# Patient Record
Sex: FEMALE | Race: WHITE
[De-identification: names, ages, dates, MRNs, and addresses within clinical notes are randomized per-mention and may not be internally consistent; named-entity substitution may affect disease eponyms.]

---

## 2022-11-29 ENCOUNTER — HOSPITAL ENCOUNTER (INPATIENT)
Dept: HOSPITAL 54 - ER | Age: 52
LOS: 21 days | Discharge: NURSING, REHAB OR LONG-TERM CARE | DRG: 317 | End: 2022-12-20
Attending: INTERNAL MEDICINE | Admitting: INTERNAL MEDICINE
Payer: MEDICAID

## 2022-11-29 VITALS — HEIGHT: 62 IN | BODY MASS INDEX: 18.58 KG/M2 | WEIGHT: 101 LBS

## 2022-11-29 VITALS — DIASTOLIC BLOOD PRESSURE: 71 MMHG | SYSTOLIC BLOOD PRESSURE: 100 MMHG
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
## 2022-11-29 DIAGNOSIS — E87.5: ICD-10-CM

## 2022-11-29 DIAGNOSIS — E87.1: ICD-10-CM

## 2022-11-29 DIAGNOSIS — Z20.822: ICD-10-CM

## 2022-11-29 DIAGNOSIS — E43: ICD-10-CM

## 2022-11-29 DIAGNOSIS — D63.8: ICD-10-CM
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
## 2022-11-29 DIAGNOSIS — D50.9: ICD-10-CM

## 2022-11-29 DIAGNOSIS — Z59.00: ICD-10-CM

## 2022-11-29 DIAGNOSIS — B96.20: ICD-10-CM

## 2022-11-29 DIAGNOSIS — S70.311A: ICD-10-CM

## 2022-11-29 DIAGNOSIS — R23.4: ICD-10-CM

## 2022-11-29 DIAGNOSIS — S70.312A: ICD-10-CM

## 2022-11-29 DIAGNOSIS — D75.839: ICD-10-CM

## 2022-11-29 DIAGNOSIS — M72.6: Primary | ICD-10-CM

## 2022-11-29 DIAGNOSIS — T79.7XXA: ICD-10-CM

## 2022-11-29 DIAGNOSIS — D62: ICD-10-CM

## 2022-11-29 DIAGNOSIS — E88.09: ICD-10-CM

## 2022-11-29 DIAGNOSIS — F25.0: ICD-10-CM

## 2022-11-29 DIAGNOSIS — F17.210: ICD-10-CM

## 2022-11-29 DIAGNOSIS — B96.4: ICD-10-CM

## 2022-11-29 DIAGNOSIS — S30.810A: ICD-10-CM

## 2022-11-29 DIAGNOSIS — E87.6: ICD-10-CM

## 2022-11-29 DIAGNOSIS — L03.115: ICD-10-CM

## 2022-11-29 DIAGNOSIS — N17.0: ICD-10-CM

## 2022-11-29 DIAGNOSIS — E83.39: ICD-10-CM

## 2022-11-29 LAB
ALBUMIN SERPL BCP-MCNC: 1.7 G/DL (ref 3.4–5)
ALP SERPL-CCNC: 86 U/L (ref 46–116)
ALT SERPL W P-5'-P-CCNC: 25 U/L (ref 12–78)
AST SERPL W P-5'-P-CCNC: 20 U/L (ref 15–37)
BASOPHILS # BLD AUTO: 0 K/UL (ref 0–0.2)
BASOPHILS NFR BLD AUTO: 0.1 % (ref 0–2)
BILIRUB DIRECT SERPL-MCNC: 0.2 MG/DL (ref 0–0.2)
BILIRUB SERPL-MCNC: 0.5 MG/DL (ref 0.2–1)
BILIRUB UR QL STRIP: NEGATIVE
BUN SERPL-MCNC: 54 MG/DL (ref 7–18)
CALCIUM SERPL-MCNC: 8.3 MG/DL (ref 8.5–10.1)
CHLORIDE SERPL-SCNC: 93 MMOL/L (ref 98–107)
CO2 SERPL-SCNC: 23 MMOL/L (ref 21–32)
COLOR UR: YELLOW
CREAT SERPL-MCNC: 1.6 MG/DL (ref 0.6–1.3)
EOSINOPHIL NFR BLD AUTO: 0 % (ref 0–6)
GLUCOSE SERPL-MCNC: 102 MG/DL (ref 74–106)
GLUCOSE UR STRIP-MCNC: NEGATIVE MG/DL
HCT VFR BLD AUTO: 32 % (ref 33–45)
HGB BLD-MCNC: 10.2 G/DL (ref 11.5–14.8)
LEUKOCYTE ESTERASE UR QL STRIP: NEGATIVE
LYMPHOCYTES NFR BLD AUTO: 1.2 K/UL (ref 0.8–4.8)
LYMPHOCYTES NFR BLD AUTO: 3.5 % (ref 20–44)
LYMPHOCYTES NFR BLD MANUAL: 2 % (ref 16–48)
MCHC RBC AUTO-ENTMCNC: 33 G/DL (ref 31–36)
MCV RBC AUTO: 88 FL (ref 82–100)
MONOCYTES NFR BLD AUTO: 0.8 K/UL (ref 0.1–1.3)
MONOCYTES NFR BLD AUTO: 2.4 % (ref 2–12)
MONOCYTES NFR BLD MANUAL: 2 % (ref 0–11)
NEUTROPHILS # BLD AUTO: 31.4 K/UL (ref 1.8–8.9)
NEUTROPHILS NFR BLD AUTO: 94 % (ref 43–81)
NEUTS SEG NFR BLD MANUAL: 96 % (ref 42–76)
NITRITE UR QL STRIP: NEGATIVE
PH UR STRIP: 5.5 [PH] (ref 5–8)
PLATELET # BLD AUTO: 463 K/UL (ref 150–450)
POTASSIUM SERPL-SCNC: 5.4 MMOL/L (ref 3.5–5.1)
PROT SERPL-MCNC: 7.5 G/DL (ref 6.4–8.2)
PROT UR QL STRIP: (no result) MG/DL
RBC # BLD AUTO: 3.56 MIL/UL (ref 4–5.2)
RBC #/AREA URNS HPF: (no result) /HPF (ref 0–2)
SODIUM SERPL-SCNC: 124 MMOL/L (ref 136–145)
UROBILINOGEN UR STRIP-MCNC: 0.2 EU/DL
WBC #/AREA URNS HPF: (no result) /HPF (ref 0–3)
WBC NRBC COR # BLD AUTO: 33.4 K/UL (ref 4.3–11)

## 2022-11-29 PROCEDURE — C9803 HOPD COVID-19 SPEC COLLECT: HCPCS

## 2022-11-29 PROCEDURE — G0480 DRUG TEST DEF 1-7 CLASSES: HCPCS

## 2022-11-29 PROCEDURE — A6403 STERILE GAUZE>16 <= 48 SQ IN: HCPCS

## 2022-11-29 PROCEDURE — P9016 RBC LEUKOCYTES REDUCED: HCPCS

## 2022-11-29 PROCEDURE — A6209 FOAM DRSG <=16 SQ IN W/O BDR: HCPCS

## 2022-11-29 PROCEDURE — A4217 STERILE WATER/SALINE, 500 ML: HCPCS

## 2022-11-29 PROCEDURE — G0378 HOSPITAL OBSERVATION PER HR: HCPCS

## 2022-11-29 PROCEDURE — A6253 ABSORPT DRG > 48 SQ IN W/O B: HCPCS

## 2022-11-29 RX ADMIN — PIPERACILLIN SODIUM AND TAZOBACTAM SODIUM SCH MLS/HR: .25; 2 INJECTION, POWDER, LYOPHILIZED, FOR SOLUTION INTRAVENOUS at 23:14

## 2022-11-29 RX ADMIN — SODIUM CHLORIDE PRN MLS/HR: 9 INJECTION, SOLUTION INTRAVENOUS at 23:12

## 2022-11-29 SDOH — ECONOMIC STABILITY - HOUSING INSECURITY: HOMELESSNESS UNSPECIFIED: Z59.00

## 2022-11-29 NOTE — NUR
bibra 100 from [facility], sitting on bus bench w/ noted R foot infected wound 
per ems was agitated was given versed 5mg im pta. to er bed 6.

## 2022-11-29 NOTE — NUR
ADMISSION NOTES,

AT 2125 RECEIVED 56 YO FEMALE,  TRANSFERRED FROM ER VIA STRETCHER ACCOMPANIED BY 2 NURSES, 
UNDER MEDICAL SERVICED OF DR ARNDT, WITH ADMITTING DX CELLULITIS/WOUND, MALNOURISHED AND 
FRAGILE FEMALE FROM STREETS, FOUND ON BUS STOP, BROUGHT BY PARAMEDICS , UNABLE TO 
FOLLOW COMMANDS, PATIENT AWAKE, ALERT TO SELF, ON ROOM AIR, NO SOB/ACUTE DISTRESS NOTED, 
WITH GENERAL WEAKNESS, NOTED WITH RIGHT LOWER LEFT EXTENSIVE WOUND, DRAINING SANGUINEOUS 
DRAINAGE, LEFT LEF NOTED WITH CELLULITIS TOO NOT OPEN AT THIS TIME, BUT SWOLLEN AND REDNESS 
NOTED, WHEN ASKED IF PATIENT IS IN PAIN, SHE DENIES, OFFERED PAIN MEDICATION, PATIENT 
REFUSED, UNABLE TO PROVIDE INFORMATION, IV SITE IN RIGHT HAND 20G PATENT AND INTACT, SACRAL 
BILATERAL BUTTOCKS PRESSURE INJURIES, BACK REDNESS ESPECIALLY BONY PROMINENCES, CHEST 
PRESSURE SORE VS EXCORIATION, CHIN PRESSURE SORE, OPEN WOUND IN RIGHT HAND, WOUND CONSULT TO 
FOLLOW UP WITH PROPER TREATMENTS, DIETARY CONSULT AS WELL, WILL ADMINISTERED MEDICATIONS AS 
ORDERED, BED BATH PROVIDED UPON ADMISSION, BED LOCKED AND IN LOWEST POSITION, KEPT PATIENT 
CLEAN WARM AND COMFORTABLE, WOUND CLEANED AND DRESSED,  WILL CONTINUE TO MONITOR CLOSELY, VS 
STABLE, AFEBRILE.

## 2022-11-29 NOTE — NUR
[id number] from [facility], sitting on bus bench w/ noted R foot infected wound 
[id number] from [facility], sitting on bus bench w/ noted R foot infected wound

## 2022-11-30 VITALS — DIASTOLIC BLOOD PRESSURE: 54 MMHG | SYSTOLIC BLOOD PRESSURE: 90 MMHG

## 2022-11-30 VITALS — SYSTOLIC BLOOD PRESSURE: 99 MMHG | DIASTOLIC BLOOD PRESSURE: 53 MMHG

## 2022-11-30 VITALS — SYSTOLIC BLOOD PRESSURE: 115 MMHG | DIASTOLIC BLOOD PRESSURE: 73 MMHG

## 2022-11-30 VITALS — SYSTOLIC BLOOD PRESSURE: 93 MMHG | DIASTOLIC BLOOD PRESSURE: 59 MMHG

## 2022-11-30 VITALS — SYSTOLIC BLOOD PRESSURE: 93 MMHG | DIASTOLIC BLOOD PRESSURE: 55 MMHG

## 2022-11-30 LAB
BASOPHILS # BLD AUTO: 0.1 K/UL (ref 0–0.2)
BASOPHILS NFR BLD AUTO: 0.2 % (ref 0–2)
BUN SERPL-MCNC: 41 MG/DL (ref 7–18)
CALCIUM SERPL-MCNC: 7 MG/DL (ref 8.5–10.1)
CHLORIDE SERPL-SCNC: 99 MMOL/L (ref 98–107)
CO2 SERPL-SCNC: 25 MMOL/L (ref 21–32)
CREAT SERPL-MCNC: 1.1 MG/DL (ref 0.6–1.3)
EOSINOPHIL NFR BLD AUTO: 0 % (ref 0–6)
GLUCOSE SERPL-MCNC: 66 MG/DL (ref 74–106)
HCT VFR BLD AUTO: 26 % (ref 33–45)
HGB BLD-MCNC: 8.6 G/DL (ref 11.5–14.8)
LYMPHOCYTES NFR BLD AUTO: 0.8 K/UL (ref 0.8–4.8)
LYMPHOCYTES NFR BLD AUTO: 2.3 % (ref 20–44)
LYMPHOCYTES NFR BLD MANUAL: 4 % (ref 16–48)
MAGNESIUM SERPL-MCNC: 2.2 MG/DL (ref 1.8–2.4)
MCHC RBC AUTO-ENTMCNC: 34 G/DL (ref 31–36)
MCV RBC AUTO: 87 FL (ref 82–100)
MONOCYTES NFR BLD AUTO: 0.9 K/UL (ref 0.1–1.3)
MONOCYTES NFR BLD AUTO: 2.6 % (ref 2–12)
MONOCYTES NFR BLD MANUAL: 1 % (ref 0–11)
NEUTROPHILS # BLD AUTO: 32.9 K/UL (ref 1.8–8.9)
NEUTROPHILS NFR BLD AUTO: 94.9 % (ref 43–81)
NEUTS BAND NFR BLD MANUAL: 15 % (ref 0–5)
NEUTS SEG NFR BLD MANUAL: 80 % (ref 42–76)
PHOSPHATE SERPL-MCNC: 4.3 MG/DL (ref 2.5–4.9)
PLATELET # BLD AUTO: 355 K/UL (ref 150–450)
POTASSIUM SERPL-SCNC: 4.5 MMOL/L (ref 3.5–5.1)
RBC # BLD AUTO: 2.93 MIL/UL (ref 4–5.2)
SODIUM SERPL-SCNC: 130 MMOL/L (ref 136–145)
TSH SERPL DL<=0.005 MIU/L-ACNC: 2.78 UIU/ML (ref 0.36–3.74)
WBC NRBC COR # BLD AUTO: 34.7 K/UL (ref 4.3–11)

## 2022-11-30 RX ADMIN — Medication SCH ML: at 16:26

## 2022-11-30 RX ADMIN — Medication SCH ML: at 11:59

## 2022-11-30 RX ADMIN — PIPERACILLIN SODIUM AND TAZOBACTAM SODIUM SCH MLS/HR: .25; 2 INJECTION, POWDER, LYOPHILIZED, FOR SOLUTION INTRAVENOUS at 17:24

## 2022-11-30 RX ADMIN — PIPERACILLIN SODIUM AND TAZOBACTAM SODIUM SCH MLS/HR: .25; 2 INJECTION, POWDER, LYOPHILIZED, FOR SOLUTION INTRAVENOUS at 23:35

## 2022-11-30 RX ADMIN — PIPERACILLIN SODIUM AND TAZOBACTAM SODIUM SCH MLS/HR: .25; 2 INJECTION, POWDER, LYOPHILIZED, FOR SOLUTION INTRAVENOUS at 11:16

## 2022-11-30 RX ADMIN — SODIUM CHLORIDE PRN MLS/HR: 9 INJECTION, SOLUTION INTRAVENOUS at 17:29

## 2022-11-30 RX ADMIN — PIPERACILLIN SODIUM AND TAZOBACTAM SODIUM SCH MLS/HR: .25; 2 INJECTION, POWDER, LYOPHILIZED, FOR SOLUTION INTRAVENOUS at 05:35

## 2022-11-30 RX ADMIN — PANTOPRAZOLE SODIUM SCH MG: 40 TABLET, DELAYED RELEASE ORAL at 07:30

## 2022-11-30 NOTE — NUR
WOUND CARE CONSULT: PT PRESENTS WITH MULTIPLE SKIN ISSUES AND WOUNDS, PRESENT ON ADMISSION 
INCLUDING VERY SIGNIFICANT RT LOWER LEG AND FOOT WOUND WITH SEROPURULENT DRAINAGE AND VERY 
FOUL ODOR, LEFT LOWER LEG AND FOOT SWELLING WITH REDNESS, AS WELL AS DRY ESCHAR TO CHIN, RT 
AND LEFT BUTTOCK DEEP TISSUE INJURIES IN EVOLUTION, SACRAL DEEP TISSUE INJURY IN EVOLUTION 
AND CACHEXIA. RECOMMENDATIONS MADE FOR SKIN PROTECTION. DISCUSSED WITH NURSING STAFF. ORTHO 
CONSULT WAS CALLED TO LOWER EXTREMITIES PER NURSING STAFF AND MD DOCUMENTATION. PT ON 
ISOFLEX LOW AIRLOSS BED. MD IN AGREEMENT WITH PLAN OF CARE. 

-------------------------------------------------------------------------------

Addendum: 11/30/22 at 1013 by RENEE JOYNER

-------------------------------------------------------------------------------

IN ADDITION: THERE ARE INTACT DEEP TISSUE INJURIES/DISCOLORATIONS ALONG SPINE WHICH IS VERY 
BONY. RECOMMENDATIONS MADE FOR SKIN PROTECTION. DISCUSSED WITH NURSING STAFF. 

-------------------------------------------------------------------------------

Addendum: 11/30/22 at 1022 by RENEE JOYNER

-------------------------------------------------------------------------------

DISCUSSED ABOVE WITH CHARGE RN. PODIATRY CONSULT ALSO CALLED TO DR CONNOR.

## 2022-11-30 NOTE — NUR
RN notes



Patient is resting in bed without active complaint. Right hand IV site is dry and intact 
with NS running at 75mL/hr. Afebrile today. Call bell is placed within reach. Bed is locked 
and placed in the lowest position. All safety measures have been implemented. Will endorse 
PM nurse to continue monitoring and care.

## 2022-11-30 NOTE — NUR
END OF SHIFT,

PATIENT ASLEEP AT THIS TIME, AT ROOM AIR, NO SIGNIFICANT CHANGE IN CONDITION DURING THE 
NIGHT, ON IVF/ANTIBIOTICS, WOUND CONSULT FOR MULTIPLE WOUNDS,  IV SITE IN RIGHT HAND 20G 
PATENT AND INTACT, BED LOCKED AND IN LOWEST POSITION, KEPT PATIENT CLEAN WARM AND 
COMFORTABLE,  STABLE VITAL SINGS,  WILL ENDORSE CONTINUITY OF CARE TO ONCOMING NURSE.

## 2022-11-30 NOTE — NUR
RN NOTE 



ATTEMPTED TO PERFORM WOUND CARE AGAIN. PATIENT REFUSES AND STARTS KICKING, STATING "LEAVE 
ME". UNABLE TO PERFORM WOUND CARE AT THIS TIME. PATIENT IS ALSO REFUSING LINEN CARE.

## 2022-11-30 NOTE — NUR
RN NOTES



Received patient in bed, alert but tired looking without active complaint.  Telemetry showed 
ST /min. Right hand IV site is dry and intact with IVF running at 75mL/hr. Call bell 
is placed within reach. Bed is locked and placed in the lowest position. All safety measures 
have been implemented.

## 2022-12-01 VITALS — DIASTOLIC BLOOD PRESSURE: 63 MMHG | SYSTOLIC BLOOD PRESSURE: 105 MMHG

## 2022-12-01 VITALS — DIASTOLIC BLOOD PRESSURE: 51 MMHG | SYSTOLIC BLOOD PRESSURE: 100 MMHG

## 2022-12-01 VITALS — SYSTOLIC BLOOD PRESSURE: 107 MMHG | DIASTOLIC BLOOD PRESSURE: 59 MMHG

## 2022-12-01 LAB
ALBUMIN SERPL BCP-MCNC: 1.1 G/DL (ref 3.4–5)
ALP SERPL-CCNC: 64 U/L (ref 46–116)
ALT SERPL W P-5'-P-CCNC: 21 U/L (ref 12–78)
AST SERPL W P-5'-P-CCNC: 24 U/L (ref 15–37)
BASOPHILS # BLD AUTO: 0 K/UL (ref 0–0.2)
BASOPHILS NFR BLD AUTO: 0.1 % (ref 0–2)
BILIRUB SERPL-MCNC: 0.4 MG/DL (ref 0.2–1)
BUN SERPL-MCNC: 26 MG/DL (ref 7–18)
CALCIUM SERPL-MCNC: 7.3 MG/DL (ref 8.5–10.1)
CHLORIDE SERPL-SCNC: 100 MMOL/L (ref 98–107)
CO2 SERPL-SCNC: 25 MMOL/L (ref 21–32)
CREAT SERPL-MCNC: 1 MG/DL (ref 0.6–1.3)
EOSINOPHIL NFR BLD AUTO: 0.1 % (ref 0–6)
GLUCOSE SERPL-MCNC: 79 MG/DL (ref 74–106)
HCT VFR BLD AUTO: 24 % (ref 33–45)
HGB BLD-MCNC: 8.1 G/DL (ref 11.5–14.8)
LYMPHOCYTES NFR BLD AUTO: 0.8 K/UL (ref 0.8–4.8)
LYMPHOCYTES NFR BLD AUTO: 3.9 % (ref 20–44)
MAGNESIUM SERPL-MCNC: 2.1 MG/DL (ref 1.8–2.4)
MCHC RBC AUTO-ENTMCNC: 34 G/DL (ref 31–36)
MCV RBC AUTO: 87 FL (ref 82–100)
MONOCYTES NFR BLD AUTO: 1 K/UL (ref 0.1–1.3)
MONOCYTES NFR BLD AUTO: 5.1 % (ref 2–12)
NEUTROPHILS # BLD AUTO: 17.8 K/UL (ref 1.8–8.9)
NEUTROPHILS NFR BLD AUTO: 90.8 % (ref 43–81)
PHOSPHATE SERPL-MCNC: 2.9 MG/DL (ref 2.5–4.9)
PLATELET # BLD AUTO: 351 K/UL (ref 150–450)
POTASSIUM SERPL-SCNC: 3.8 MMOL/L (ref 3.5–5.1)
PROT SERPL-MCNC: 5.4 G/DL (ref 6.4–8.2)
RBC # BLD AUTO: 2.76 MIL/UL (ref 4–5.2)
SODIUM SERPL-SCNC: 131 MMOL/L (ref 136–145)
WBC NRBC COR # BLD AUTO: 19.6 K/UL (ref 4.3–11)

## 2022-12-01 RX ADMIN — DEXTROSE MONOHYDRATE SCH MLS/HR: 50 INJECTION, SOLUTION INTRAVENOUS at 00:31

## 2022-12-01 RX ADMIN — DEXTROSE MONOHYDRATE SCH MLS/HR: 50 INJECTION, SOLUTION INTRAVENOUS at 12:19

## 2022-12-01 RX ADMIN — Medication SCH ML: at 08:09

## 2022-12-01 RX ADMIN — PANTOPRAZOLE SODIUM SCH MG: 40 TABLET, DELAYED RELEASE ORAL at 08:09

## 2022-12-01 RX ADMIN — PIPERACILLIN SODIUM AND TAZOBACTAM SODIUM SCH MLS/HR: .25; 2 INJECTION, POWDER, LYOPHILIZED, FOR SOLUTION INTRAVENOUS at 17:01

## 2022-12-01 RX ADMIN — PIPERACILLIN SODIUM AND TAZOBACTAM SODIUM SCH MLS/HR: .25; 2 INJECTION, POWDER, LYOPHILIZED, FOR SOLUTION INTRAVENOUS at 11:00

## 2022-12-01 RX ADMIN — Medication SCH ML: at 12:19

## 2022-12-01 RX ADMIN — PIPERACILLIN SODIUM AND TAZOBACTAM SODIUM SCH MLS/HR: .25; 2 INJECTION, POWDER, LYOPHILIZED, FOR SOLUTION INTRAVENOUS at 05:25

## 2022-12-01 RX ADMIN — Medication SCH ML: at 17:01

## 2022-12-01 RX ADMIN — SODIUM CHLORIDE PRN MLS/HR: 9 INJECTION, SOLUTION INTRAVENOUS at 16:11

## 2022-12-01 NOTE — NUR
PATIENT REFUSING WOUND CARE. STATES SHE IS "TIRED" AND WANTS TO BE LEFT ALONE. PROVIDED 
EDUCATION ON THE IMPORTANCE OF WOUND CARE. PATIENT COVERED HER FACE AND REFUSES TO TALK 
FURTHER. WILL CONTINUE PLAN OF CARE AND ANTICIPATE NEEDS.

## 2022-12-01 NOTE — NUR
SS Consult: 

SS consult requested for homelessness and missing last name. The pt. is a 57 -year-old 
 female pt. admitted for failure to thrive per EMR. Upon SS consult, the pt. is 
Alert & Oriented x 3 and makes good eye contact. The pt. appears disheveled with paranoid 
mood & affect. Pt.s speech is clear and has disorganized thought process. Pt. remained 
guarded, calm & somewhat cooperative throughout interview. Pt. denies SI/HI and denies 
visual hallucinations. Pt. states she ignores the voices. Pt. denies having commanding 
auditory hallucinations. Pt. denies any previous diagnosis of mental illness and denies use 
of medication for her mental health. SW explored pt.s living situation. [place] [address] and refused to give details of where she resides. SW explored pt.s drug & 
ETOH use. Pt. denies use of any alcohol & drug use. Pt. stated she was ambulating 
independently prior to hurting her foot. SW explored pt.s support system. Pt. refused to 
provide any contact information of family or friends. 



Plan: SW provided homeless resources: shelters, storage, rehousing services, food luna, 
etc. and pt. accepted them. Pt. signed homeless waiver and it was placed in the pt.s chart. 
Pt. did not answer VIJAY when asked if she would like shelter placement. SW can will follow up 
as needed. 



Year-round shelters: [facility] [place] [address] [place], [place] [address] [phone]; 
[facility] [address] [place] [address]; [facility] [id number] 
[facility]. [facility] [address] [phone]

Hygiene: [facility]: [address] [place] [phone]; [facility] 
[address] [facility] [phone]; [facility] [address], [place] [phone].

Food Resources: [facility] at [facility]- [address] 
[place]; [facility] Each Need [facility] ([facility]) [address]; [facility] [address] [facility]; [facility] 
[address] [facility]. 

Mental Health resources provided: [facility] [address] [place], [place] [address] [phone]; [facility] [address], 
[place] [address] [phone]; [facility] 
[address] [place] [address] [phone]; [facility] [address] 
[place], [place] [id number] [phone]

Healthcare Clinics: [facility] [address] [facility], [address] [place]. 
[place] [phone]; [facility] [address] 
[place] [address]; [facility] [address] [facility] [place] [address] [phone]



Counseling--Outpatient

[facility]

[address]

[place] [address] [phone]

(Specializes in in-depth psychotherapy for emotional distress: anxiety, depression, 
interpersonal conflicts, life transitions, childhood abuse)



[facility]

[address] [place] [address] [phone]

(Assist with solving problem marital difficulties, separation & divorce, aging parents, 
death & grief, chronic & terminal illness)



[facility]

[address] [place] [address] [phone]

(Deal with loss & grief, anxiety, marital difficulties) 



Homebound/Mental Health Services

[address] [facility], [address]

[place] [address] [phone]

(Provide in-home mental services to people who are incapable of leaving their homes)



[facility] Elderly

Senior Service/Resource Center

[address]

[place] [address] [phone]



[facility] 

[address] [facility] [address]

[place], [place] [id number] [phone]

PSYCHIATRIC OUTPATIENT SERVICES



[facility] Partial Hospitalization and Intensive Outpatient Program (Managed Care 
and [place] Only)[address]

[facility] [phone]

[facility]

Partial Hospitalization and Outpatient [address]  [address]

[place], [place] [phone]

KEISHA ENRIQUEZ

[facility] [place] [address]

[place], [place] [phone]

[facility] Partial Hospitalization and Outpatient 
[address] [facility]

Keisha Enriquez, [phone]



Substance Abuse resources provided included: [facility] Substance Abuse 
Self-Helpline ([facility]) [phone]; [facility] [address] [facility]. [place] [address] [phone]; [facility] [address] [facility] [address] [phone]; 
[facility] [address] [facility] [id number] [phone]; [facility] [address] [facility] [address] [phone];  [facility] [address] [facility] [address] [phone]; [facility] [address] [facility] [address] [phone]; [facility] Substance Abuse Helpline([facility] [phone]; [facility]  
[phone]; [facility] [phone] [place]; [facility]  [phone] [place]; [facility]  [phone] [place]; [facility] 
[phone] Keisha Rileyarianna; [facility]  [phone] [place]; [facility] [phone] [place]; [facility] [phone] Maria Guadalupe; [facility]. 
[phone] Issa Cheung; Alcoholics Anonymous [phone] [phone]; [facility] [phone]; [facility] [phone] [phone]; Narcotics Anonymous www.[employer].org;



 


-------------------------------------------------------------------------------

Addendum: 12/01/22 at 1538 by JACKIE LINARES

-------------------------------------------------------------------------------

Correction: Pt. was admitted for foot wound per EMR. Pt. refused to sign homeless waiver and 
it was placed in the pt.'s chart.

## 2022-12-01 NOTE — NUR
RN OPENING NOTE



PT A&OX0. STATED THAT SHE HAD TO MAKE A BM AND WAS PLACED ON BED PAN AND CLEANED. SKIN IS 
WARM AND DRY. RESPIRATIONS EVEN AND UNLABORED ON RA. NO ACUTE SIGNS OF DISTRESS. 20 G R HAND 
IV INTACT AND RUNNING NS @ 75 ML/HR. PT DENIES OTHER NEEDS AT THIS TIME. SAFETY PRECAUTIONS 
IN PLACE. BED LOCKED AND AT LOWEST POSITION WITH X2 RAILS UP. BED ALARM ON AND CALL LIGHT 
WITHIN REACH.

## 2022-12-01 NOTE — NUR
RN CLOSING NOTES:



RECEIVED PT IN BED, ALERT/ORIENTED X0, VERY CONFUSED. ON ROOM AIR AND PT TOLERATED WELL. O2 
SAT 99%. IV ACCESS ON RT HAND#20G INTACT AND PATENT. NO S/S OF INFILTRATIONS. RUNNING NS AT 
75CC/HR. NO C/O PAIN OR DISCOMFORT. NO ACUTE DISTRESS. WOUND CARE DONE. NOTED FOUL SMELL. 
ALL DUE MEDS GIVEN GIVEN AS ORDERED. ALL SAFETY MEASURES IN PLACE. SIDE RAILS UP X3, PLACE 
CALL LIGHT WITH IN REACH. WILL ENDORSE TO MORNING SHIFT NURSE.

## 2022-12-01 NOTE — NUR
Pt. provided full name: Cathy Tobin and : 1970. SW left admission dept, Izabel a 
voicemail with information.

## 2022-12-01 NOTE — NUR
RN NOTES



PATIENT REPORT RECEIVED FROM NIGHTSHIFT RNADEBAYO. PATIENT IN ROOM ASLEEP BREATHING EVENLY 
AND UNLABORED ON ROOM AIR. IV ACCESS MAINTAINED ON RIGHT HAND 20 GAUGE RUNNING FLUIDS AS 
ORDERED. SAFETY MEASURES IN PLACE WILL CONTINUE PLAN OF CARE AND ANTICIPATE NEEDS.

## 2022-12-02 VITALS — DIASTOLIC BLOOD PRESSURE: 61 MMHG | SYSTOLIC BLOOD PRESSURE: 109 MMHG

## 2022-12-02 VITALS — SYSTOLIC BLOOD PRESSURE: 107 MMHG | DIASTOLIC BLOOD PRESSURE: 61 MMHG

## 2022-12-02 VITALS — DIASTOLIC BLOOD PRESSURE: 56 MMHG | SYSTOLIC BLOOD PRESSURE: 104 MMHG

## 2022-12-02 VITALS — SYSTOLIC BLOOD PRESSURE: 98 MMHG | DIASTOLIC BLOOD PRESSURE: 48 MMHG

## 2022-12-02 VITALS — SYSTOLIC BLOOD PRESSURE: 103 MMHG | DIASTOLIC BLOOD PRESSURE: 60 MMHG

## 2022-12-02 LAB
BASOPHILS # BLD AUTO: 0 K/UL (ref 0–0.2)
BASOPHILS NFR BLD AUTO: 0.1 % (ref 0–2)
BUN SERPL-MCNC: 17 MG/DL (ref 7–18)
CALCIUM SERPL-MCNC: 7.3 MG/DL (ref 8.5–10.1)
CHLORIDE SERPL-SCNC: 101 MMOL/L (ref 98–107)
CO2 SERPL-SCNC: 25 MMOL/L (ref 21–32)
CREAT SERPL-MCNC: 0.9 MG/DL (ref 0.6–1.3)
EOSINOPHIL NFR BLD AUTO: 0.1 % (ref 0–6)
GLUCOSE SERPL-MCNC: 57 MG/DL (ref 74–106)
HCT VFR BLD AUTO: 25 % (ref 33–45)
HGB BLD-MCNC: 8.1 G/DL (ref 11.5–14.8)
LYMPHOCYTES NFR BLD AUTO: 0.7 K/UL (ref 0.8–4.8)
LYMPHOCYTES NFR BLD AUTO: 6.2 % (ref 20–44)
MAGNESIUM SERPL-MCNC: 1.9 MG/DL (ref 1.8–2.4)
MCHC RBC AUTO-ENTMCNC: 33 G/DL (ref 31–36)
MCV RBC AUTO: 88 FL (ref 82–100)
MONOCYTES NFR BLD AUTO: 0.7 K/UL (ref 0.1–1.3)
MONOCYTES NFR BLD AUTO: 6 % (ref 2–12)
NEUTROPHILS # BLD AUTO: 10 K/UL (ref 1.8–8.9)
NEUTROPHILS NFR BLD AUTO: 87.6 % (ref 43–81)
PHOSPHATE SERPL-MCNC: 2.6 MG/DL (ref 2.5–4.9)
PLATELET # BLD AUTO: 353 K/UL (ref 150–450)
POTASSIUM SERPL-SCNC: 3.3 MMOL/L (ref 3.5–5.1)
RBC # BLD AUTO: 2.82 MIL/UL (ref 4–5.2)
SODIUM SERPL-SCNC: 132 MMOL/L (ref 136–145)
WBC NRBC COR # BLD AUTO: 11.5 K/UL (ref 4.3–11)

## 2022-12-02 RX ADMIN — POTASSIUM CHLORIDE SCH MLS/HR: 200 INJECTION, SOLUTION INTRAVENOUS at 12:59

## 2022-12-02 RX ADMIN — SODIUM CHLORIDE PRN MLS/HR: 9 INJECTION, SOLUTION INTRAVENOUS at 15:56

## 2022-12-02 RX ADMIN — Medication SCH ML: at 16:44

## 2022-12-02 RX ADMIN — PANTOPRAZOLE SODIUM SCH MG: 40 TABLET, DELAYED RELEASE ORAL at 08:14

## 2022-12-02 RX ADMIN — PIPERACILLIN SODIUM AND TAZOBACTAM SODIUM SCH MLS/HR: .25; 2 INJECTION, POWDER, LYOPHILIZED, FOR SOLUTION INTRAVENOUS at 00:03

## 2022-12-02 RX ADMIN — PIPERACILLIN SODIUM AND TAZOBACTAM SODIUM SCH MLS/HR: .25; 2 INJECTION, POWDER, LYOPHILIZED, FOR SOLUTION INTRAVENOUS at 12:25

## 2022-12-02 RX ADMIN — PIPERACILLIN SODIUM AND TAZOBACTAM SODIUM SCH MLS/HR: .25; 2 INJECTION, POWDER, LYOPHILIZED, FOR SOLUTION INTRAVENOUS at 17:07

## 2022-12-02 RX ADMIN — Medication SCH ML: at 12:05

## 2022-12-02 RX ADMIN — Medication SCH ML: at 08:15

## 2022-12-02 RX ADMIN — DEXTROSE MONOHYDRATE SCH MLS/HR: 50 INJECTION, SOLUTION INTRAVENOUS at 11:23

## 2022-12-02 RX ADMIN — PIPERACILLIN SODIUM AND TAZOBACTAM SODIUM SCH MLS/HR: .25; 2 INJECTION, POWDER, LYOPHILIZED, FOR SOLUTION INTRAVENOUS at 06:38

## 2022-12-02 RX ADMIN — POTASSIUM CHLORIDE SCH MLS/HR: 200 INJECTION, SOLUTION INTRAVENOUS at 14:00

## 2022-12-02 RX ADMIN — SODIUM HYPOCHLORITE SCH ML: 5 SOLUTION TOPICAL at 12:08

## 2022-12-02 RX ADMIN — DEXTROSE MONOHYDRATE SCH MLS/HR: 50 INJECTION, SOLUTION INTRAVENOUS at 00:00

## 2022-12-02 NOTE — NUR
WOUND CARE CONSULT/FOLLOW UP: RECEIVED ANOTHER CONSULT FOR LOWER EXTREMITIES. PT HAS SEVERE 
WOUNDS/SWELLING WITH VERY FOUL DRAINAGE TO RT LOWER LEG AND FOOT, PRESENT ON ADMISSION. 
NEEDS SURGICAL CONSULT. DISCUSSED WITH CHARGE RN AND NURSING STAFF. DEFER TO PMD FOR ORTHO 
CONSULT. PT HAS BEEN REFUSING MRI. DISCUSSED RECOMMENDATIONS FOR SKIN PROTECTION AND WOUND 
CARE WHILE AWAITING SURGICAL/ORTHO CONSULT.

## 2022-12-02 NOTE — NUR
RN NOTES 

PT REFUSED TO HAVE MRI DONE, DESPITE EXPLANATION OF RISK INCLUDING DEATH , PT STILL REFUSED, 
DR OROZCO NOTIFIED,

## 2022-12-02 NOTE — NUR
PATIENT VERBALIZED SHE HAD MRI 2003 AND DENIED ANY METAL ON HER BODY.AGREED WITH THE MRI.MRI 
NOTIFIED.

## 2022-12-02 NOTE — NUR
RN OPENING NOTES



PT A&OX0 AND CONFUSED. SKIN IS WARM AND DRY. ODOR FROM R LEG NOTED. DRESSING IN PLACE ON 
RLE. NEREYDA IV INTACT AND RUNNING NS @ 75 ML/HR. PT ON RA AND TOLERATING WELL. NO ACUTE SIGNS 
OF DISTRESS. PT DRANK A CARTON OF MILK. SAFETY PRECAUTIONS IN PLACE. BED LOCKED AND AT 
LOWEST LEVEL. X2 RAILS UP AND BED ALARM ON. CALL LIGHT WITHIN REACH. DENIES OTHER NEEDS AT 
THIS TIME.

## 2022-12-02 NOTE — NUR
RN NOTES 

PT REFUSED TO HAVE DRESSING CHANGE DONE TO RIGHT LEG , STATED IT IS OK  . EXPLAINED TO PT 
HOW IMPORTANT IS TO STAY WITH PLAN OF CARE , PT STILL REFUSED .

## 2022-12-02 NOTE — NUR
RN NOTES 

PT REFUSING CARE, DR OROZCO AND DR STEWARD ( PSYCH )  NOTIFIED  , NEW PSYCH CONSULT ORDER 
PLACED ,IF PT HAS CAPACITY TO MAKE DECISION.

## 2022-12-02 NOTE — NUR
RN NOTES 

PT CONFUSED AT TIMES, REFUSES CARE , MD NOTIFIED . SR UP x3, CALL LIGHT WITHIN EASY REACH, 
BED LOCKED AND IN LOWEST POSITION, WILL ENDORSE TO NIGHT SHIFT NURSER FOR CONTINUITY OF CARE

## 2022-12-02 NOTE — NUR
RN NOTES 

RECEIVED PT ON BED, Ax0x1, ON RA , O2 SAT WNL, NO DISTESS NOTED, DRESSING TO RIGHT LEG 
INTACT, SR UP x3, CALL LIGHT WITHIN EASY REACH, BED LOCKED AND IN LOWEST POSITION, CONTINUE 
TO MONITOR.

## 2022-12-03 VITALS — SYSTOLIC BLOOD PRESSURE: 99 MMHG | DIASTOLIC BLOOD PRESSURE: 61 MMHG

## 2022-12-03 VITALS — SYSTOLIC BLOOD PRESSURE: 104 MMHG | DIASTOLIC BLOOD PRESSURE: 62 MMHG

## 2022-12-03 VITALS — DIASTOLIC BLOOD PRESSURE: 55 MMHG | SYSTOLIC BLOOD PRESSURE: 103 MMHG

## 2022-12-03 LAB
APAP SERPL-MCNC: < 10 UG/ML (ref 10–30)
BASOPHILS # BLD AUTO: 0 K/UL (ref 0–0.2)
BASOPHILS NFR BLD AUTO: 0.1 % (ref 0–2)
BUN SERPL-MCNC: 11 MG/DL (ref 7–18)
CALCIUM SERPL-MCNC: 7.4 MG/DL (ref 8.5–10.1)
CHLORIDE SERPL-SCNC: 103 MMOL/L (ref 98–107)
CO2 SERPL-SCNC: 30 MMOL/L (ref 21–32)
CREAT SERPL-MCNC: 0.8 MG/DL (ref 0.6–1.3)
EOSINOPHIL NFR BLD AUTO: 0.4 % (ref 0–6)
ETHANOL SERPL-MCNC: < 3 MG/DL (ref 0–0)
GLUCOSE SERPL-MCNC: 93 MG/DL (ref 74–106)
HCT VFR BLD AUTO: 26 % (ref 33–45)
HGB BLD-MCNC: 8.6 G/DL (ref 11.5–14.8)
LYMPHOCYTES NFR BLD AUTO: 0.9 K/UL (ref 0.8–4.8)
LYMPHOCYTES NFR BLD AUTO: 11 % (ref 20–44)
MAGNESIUM SERPL-MCNC: 1.8 MG/DL (ref 1.8–2.4)
MCHC RBC AUTO-ENTMCNC: 33 G/DL (ref 31–36)
MCV RBC AUTO: 89 FL (ref 82–100)
MONOCYTES NFR BLD AUTO: 0.6 K/UL (ref 0.1–1.3)
MONOCYTES NFR BLD AUTO: 7.3 % (ref 2–12)
NEUTROPHILS # BLD AUTO: 6.7 K/UL (ref 1.8–8.9)
NEUTROPHILS NFR BLD AUTO: 81.2 % (ref 43–81)
PHOSPHATE SERPL-MCNC: 2 MG/DL (ref 2.5–4.9)
PLATELET # BLD AUTO: 372 K/UL (ref 150–450)
POTASSIUM SERPL-SCNC: 3.6 MMOL/L (ref 3.5–5.1)
RBC # BLD AUTO: 2.95 MIL/UL (ref 4–5.2)
SODIUM SERPL-SCNC: 136 MMOL/L (ref 136–145)
WBC NRBC COR # BLD AUTO: 8.3 K/UL (ref 4.3–11)

## 2022-12-03 RX ADMIN — PIPERACILLIN SODIUM AND TAZOBACTAM SODIUM SCH MLS/HR: .25; 2 INJECTION, POWDER, LYOPHILIZED, FOR SOLUTION INTRAVENOUS at 17:19

## 2022-12-03 RX ADMIN — Medication SCH ML: at 12:00

## 2022-12-03 RX ADMIN — Medication SCH ML: at 17:00

## 2022-12-03 RX ADMIN — PIPERACILLIN SODIUM AND TAZOBACTAM SODIUM SCH MLS/HR: .25; 2 INJECTION, POWDER, LYOPHILIZED, FOR SOLUTION INTRAVENOUS at 05:49

## 2022-12-03 RX ADMIN — Medication SCH ML: at 07:40

## 2022-12-03 RX ADMIN — PANTOPRAZOLE SODIUM SCH MG: 40 TABLET, DELAYED RELEASE ORAL at 07:51

## 2022-12-03 RX ADMIN — PIPERACILLIN SODIUM AND TAZOBACTAM SODIUM SCH MLS/HR: .25; 2 INJECTION, POWDER, LYOPHILIZED, FOR SOLUTION INTRAVENOUS at 12:45

## 2022-12-03 RX ADMIN — DEXTROSE MONOHYDRATE SCH MLS/HR: 50 INJECTION, SOLUTION INTRAVENOUS at 00:45

## 2022-12-03 RX ADMIN — SODIUM CHLORIDE PRN MLS/HR: 9 INJECTION, SOLUTION INTRAVENOUS at 11:33

## 2022-12-03 RX ADMIN — DEXTROSE MONOHYDRATE SCH MLS/HR: 50 INJECTION, SOLUTION INTRAVENOUS at 11:33

## 2022-12-03 RX ADMIN — PIPERACILLIN SODIUM AND TAZOBACTAM SODIUM SCH MLS/HR: .25; 2 INJECTION, POWDER, LYOPHILIZED, FOR SOLUTION INTRAVENOUS at 00:08

## 2022-12-03 RX ADMIN — Medication PRN TAB: at 13:36

## 2022-12-03 RX ADMIN — SODIUM HYPOCHLORITE SCH ML: 5 SOLUTION TOPICAL at 09:22

## 2022-12-03 NOTE — NUR
RN TELE CLOSING NOTES:

PATIENT IN BED, AWAKE, ALERT, ORIENTED TO NAME AND PLACE.  NO C/O PAIN OR DISCOMFORT AT THIS 
TIME.  ON NS @ 75 ML,/HR VIA IV SITE ON LEFT UPPER ARM, NO S/S INFILTRATION NOTED.  ALL 
NEEDS MET AND ANTICIPATED.  CALL LIGHT WITHIN REACH.  BED LOCKED AND IN LOWEST POSITION. 
WILL ENDORSE  TO NEXT SHIFT NURSE FOR CONTINUITY OF CARE.

## 2022-12-03 NOTE — NUR
RN OPENING NOTES:

RECEIVED PATIENT IN BED AWAKE, PT IS ALERT BUT SEEMS TO BE CONFUSED, WITH SWITCHING 
CONVERSATION TOPICS.ON RA, TOLERATING WELL, NO S/S OF DISTRESS.   IV ACCESS ON NEREYDA RUNNING 
NS@ 75 ML/HR, IV SITE WITH NO S/S INFILTRATION NOTED. PT RIGHT LEG DRESSING C/D/I.  ALL 
SAFETY MEASURES IN PLACE.  CALL LIGHT WITHIN REACH. BED LOCKED AND IN LOWEST POSITION.  WILL 
CONTINUE TO MONITOR PATIENT THROUGHOUT SHIFT

## 2022-12-03 NOTE — NUR
EDUCATED PT AND PROMOTED DRESSING CHANGE. PT REFUSED SINCE SHE "COULDN'T TAKE THE PAIN" LAST 
TIME IT WAS DONE. OFFERED PT PAIN MEDICATION PRIOR TO CHANGE BUT PT REFUSED.

## 2022-12-03 NOTE — NUR
RN NOTE

PT REFUSED DRESSING CHANGE AND WOUND CLEANING. SHE STATED IT WAS DONE AT 3PM AND DOESNT NEED 
TO BE DONE AGAIN. I TOLD HER THAT IT IS LEAKING THROUGH THE BANDAGES AND THEY NEED TO BE 
CHANGED SHE HER WORDS WERE TO PLACE THE PAD UNDER HER LEG. I TOLD HER I CAN GIVE HER PAIN 
MEDICINE AND COME BACK, SHE STILL REFUSED.

## 2022-12-03 NOTE — NUR
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 Requesting for a return call and if patient needs to be scheduled to see the provider, will need five days out. ZOSYN AVAILABLE ON HAND IS WITH A DIFFERENT TIME.  CALLED PHARMACY, SPOKE WITH JARITHA AND 
WAS INFORMED TO GIVE MEDICATION SINCE THE ORDER IS THE SAME.  MED ADMINISTERED AS ORDERED BY 
MD

## 2022-12-03 NOTE — NUR
CALLED PHARMACY, SPOKE WITH KRISTINA AND INFORMED OF TROUGH OF 23 AND STATED TO GIVE VANCO AS 
ORDERED BY MD.

## 2022-12-03 NOTE — NUR
RN CLOSING NOTE



PT REMAINS CONFUSED. SKIN IS WARM AND DRY. ODOR AND LEAKAGE FROM R LEG DRESSING NOTED.  NEREYDA 
IV INTACT AND RUNNING ABX. PT ON RA AND TOLERATING WELL. NO ACUTE SIGNS OF DISTRESS. SAFETY 
PRECAUTIONS IN PLACE. BED LOCKED AND AT LOWEST LEVEL. X2 RAILS UP AND BED ALARM ON. CALL 
LIGHT WITHIN REACH.

## 2022-12-03 NOTE — NUR
RN OPENING NOTES:

RECEIVED PATIENT IN BED ASLEEP BUT EASILY AROUSES TO SOUND AND TOUCH.  PATIENT IS ALERT TO 
NAME BUT NOTED WITH PERIODS OF CONFUSION.  NO SOB NOTED, ON RA WITH OXYGEN SATURATION OF 
100%. NO C/O PAIN OR DISCOMFORT AT THIS TIME.  IV ACCESS ON LEFT UPPER ARM IS INTACT WITH IV 
FLUID OF NS RUNNING @ 75 ML/HR, IV SITE WITH NO S/S INFILTRATION NOTED.  CALL LIGHT WITHIN 
REACH.  ALL SAFETY MEASURES IN PLACE.  BED LOCKED AND IN LOWEST POSITION.  WILL CONTINUE TO 
MONITOR PATIENT THROUGHOUT SHIFT.

## 2022-12-04 VITALS — SYSTOLIC BLOOD PRESSURE: 94 MMHG | DIASTOLIC BLOOD PRESSURE: 49 MMHG

## 2022-12-04 VITALS — SYSTOLIC BLOOD PRESSURE: 103 MMHG | DIASTOLIC BLOOD PRESSURE: 62 MMHG

## 2022-12-04 VITALS — DIASTOLIC BLOOD PRESSURE: 96 MMHG | SYSTOLIC BLOOD PRESSURE: 125 MMHG

## 2022-12-04 LAB
BASOPHILS # BLD AUTO: 0 K/UL (ref 0–0.2)
BASOPHILS NFR BLD AUTO: 0.1 % (ref 0–2)
BUN SERPL-MCNC: 13 MG/DL (ref 7–18)
CALCIUM SERPL-MCNC: 6.8 MG/DL (ref 8.5–10.1)
CHLORIDE SERPL-SCNC: 104 MMOL/L (ref 98–107)
CO2 SERPL-SCNC: 28 MMOL/L (ref 21–32)
CREAT SERPL-MCNC: 0.7 MG/DL (ref 0.6–1.3)
EOSINOPHIL NFR BLD AUTO: 0.4 % (ref 0–6)
GLUCOSE SERPL-MCNC: 74 MG/DL (ref 74–106)
HCT VFR BLD AUTO: 21 % (ref 33–45)
HGB BLD-MCNC: 7.3 G/DL (ref 11.5–14.8)
LYMPHOCYTES NFR BLD AUTO: 1.1 K/UL (ref 0.8–4.8)
LYMPHOCYTES NFR BLD AUTO: 13.8 % (ref 20–44)
MAGNESIUM SERPL-MCNC: 1.7 MG/DL (ref 1.8–2.4)
MCHC RBC AUTO-ENTMCNC: 34 G/DL (ref 31–36)
MCV RBC AUTO: 89 FL (ref 82–100)
MONOCYTES NFR BLD AUTO: 0.6 K/UL (ref 0.1–1.3)
MONOCYTES NFR BLD AUTO: 7.6 % (ref 2–12)
NEUTROPHILS # BLD AUTO: 6.1 K/UL (ref 1.8–8.9)
NEUTROPHILS NFR BLD AUTO: 78.1 % (ref 43–81)
PHOSPHATE SERPL-MCNC: 2.4 MG/DL (ref 2.5–4.9)
PLATELET # BLD AUTO: 313 K/UL (ref 150–450)
POTASSIUM SERPL-SCNC: 3.4 MMOL/L (ref 3.5–5.1)
RBC # BLD AUTO: 2.4 MIL/UL (ref 4–5.2)
SODIUM SERPL-SCNC: 137 MMOL/L (ref 136–145)
WBC NRBC COR # BLD AUTO: 7.8 K/UL (ref 4.3–11)

## 2022-12-04 RX ADMIN — PANTOPRAZOLE SODIUM SCH MG: 40 TABLET, DELAYED RELEASE ORAL at 07:51

## 2022-12-04 RX ADMIN — DEXTROSE MONOHYDRATE SCH MLS/HR: 50 INJECTION, SOLUTION INTRAVENOUS at 00:54

## 2022-12-04 RX ADMIN — SODIUM CHLORIDE PRN MLS/HR: 9 INJECTION, SOLUTION INTRAVENOUS at 23:51

## 2022-12-04 RX ADMIN — Medication SCH ML: at 07:53

## 2022-12-04 RX ADMIN — Medication SCH ML: at 17:29

## 2022-12-04 RX ADMIN — MAGNESIUM SULFATE IN DEXTROSE SCH MLS/HR: 10 INJECTION, SOLUTION INTRAVENOUS at 11:00

## 2022-12-04 RX ADMIN — PIPERACILLIN SODIUM AND TAZOBACTAM SODIUM SCH MLS/HR: .25; 2 INJECTION, POWDER, LYOPHILIZED, FOR SOLUTION INTRAVENOUS at 05:27

## 2022-12-04 RX ADMIN — DEXTROSE MONOHYDRATE SCH MLS/HR: 50 INJECTION, SOLUTION INTRAVENOUS at 12:31

## 2022-12-04 RX ADMIN — PIPERACILLIN SODIUM AND TAZOBACTAM SODIUM SCH MLS/HR: .25; 2 INJECTION, POWDER, LYOPHILIZED, FOR SOLUTION INTRAVENOUS at 18:06

## 2022-12-04 RX ADMIN — PIPERACILLIN SODIUM AND TAZOBACTAM SODIUM SCH MLS/HR: .25; 2 INJECTION, POWDER, LYOPHILIZED, FOR SOLUTION INTRAVENOUS at 11:59

## 2022-12-04 RX ADMIN — MAGNESIUM SULFATE IN DEXTROSE SCH MLS/HR: 10 INJECTION, SOLUTION INTRAVENOUS at 09:38

## 2022-12-04 RX ADMIN — SODIUM HYPOCHLORITE SCH ML: 5 SOLUTION TOPICAL at 09:45

## 2022-12-04 RX ADMIN — Medication SCH ML: at 12:31

## 2022-12-04 RX ADMIN — SODIUM CHLORIDE PRN MLS/HR: 9 INJECTION, SOLUTION INTRAVENOUS at 05:27

## 2022-12-04 RX ADMIN — PIPERACILLIN SODIUM AND TAZOBACTAM SODIUM SCH MLS/HR: .25; 2 INJECTION, POWDER, LYOPHILIZED, FOR SOLUTION INTRAVENOUS at 00:02

## 2022-12-04 RX ADMIN — PIPERACILLIN SODIUM AND TAZOBACTAM SODIUM SCH MLS/HR: .25; 2 INJECTION, POWDER, LYOPHILIZED, FOR SOLUTION INTRAVENOUS at 23:41

## 2022-12-04 NOTE — NUR
TELE RN CLOSING NOTE



PATIENT IN BED AWAKE, A/O X2 with episodes of confusion, ON ROOM AIR, TOLERATING WELL, NO 
S/S OF DISTRESS. ABLE TO MAKE NEEDS KNOWN. . IV ACCESS NEREYDA #22G RUNNING NS@75ML/HR. WOUND 
DRESSING IS SOAKED PER NIGHT RN PATIENT REFUSED DRESSING CHANGED ALL SAFETY MEASURES IN 
PLACE.  CALL LIGHT WITHIN REACH. BED LOCKED AND IN LOWEST POSITION.  WILL MONITOR.

## 2022-12-04 NOTE — NUR
RN notes:

pt refused wound care, explained risk and benefits still say later will offer again later

## 2022-12-04 NOTE — NUR
med surg rn closing notes:

PT IN BED AWAKE, ALERT X 1, RESPIRATION IS EVEN AND UNLABORED, ON ROOM AIR SATTING 98% ,SKIN 
IS WARM AND DRY. SHAHRIAR MIDLINE INTACT AND RUNNING D5W @ 75 HL/HR. JEVITY FEEDING STOPPED AT 
0600. SUPRAPUBIC SALINAS INTACT. ALL DUE MEDS ARE GIVEN AS ORDERED.KEPT CLEAN AND DRY AND 
COMFORTABLE, REPOSITION EVERY 2 HOURS,SAFETY PRECAUTIONS IN PLACE. BED LOCKED AND AT LOWEST 
LEVEL. X2 RAILS UP AND BED ALARM ON. R SOFT WRIST RESTRAINTS IN PLACE. CALL LIGHT WITHIN 
REACH ENDORSED TO NIGHT SHIFT RN FOR RAUL

## 2022-12-04 NOTE — NUR
MED SURGE OPEN NOTE: ALERT TO NAME AND TIME. REORIENTED TO PLACE AND SITUATION. UNLABORED 
BREATHING SATING AT 98 % AT ROOM AIR.  IVF OF NS 75 ML/HR. IV SITE ON LEFT UPPER ARM G 22, 
PATENT NO S/S OF COMPLICATIONS. RLE WITH DRESSING ON. BILATERAL HALF SIDE RAILS UP X2. HOB 
ELEVATED SEMI FOWLERS POSITION. BED IN LOW POSITION, LOCKED AND EXIT ALARM ON. CALL LIGHT IN 
REACH.

## 2022-12-04 NOTE — NUR
RN CLOSING NOTES:

PATIENT IN BED ASLEEP, AROUSES EASILY.ON RA, TOLERATING WELL, NO S/S OF DISTRESS.   IV 
ACCESS ON NEREYDA RUNNING NS@ 75 ML/HR, IV SITE WITH NO S/S INFILTRATION NOTED. PT RIGHT LEG 
DRESSING C/D/I. ALL DUE MEDS GIVEN. ALL SAFETY MEASURES IN PLACE.  CALL LIGHT WITHIN REACH. 
BED LOCKED AND IN LOWEST POSITION.  WILL ENDORSE TO MORNING SHIFT.

## 2022-12-05 VITALS — DIASTOLIC BLOOD PRESSURE: 59 MMHG | SYSTOLIC BLOOD PRESSURE: 113 MMHG

## 2022-12-05 VITALS — DIASTOLIC BLOOD PRESSURE: 53 MMHG | SYSTOLIC BLOOD PRESSURE: 89 MMHG

## 2022-12-05 VITALS — DIASTOLIC BLOOD PRESSURE: 62 MMHG | SYSTOLIC BLOOD PRESSURE: 108 MMHG

## 2022-12-05 VITALS — DIASTOLIC BLOOD PRESSURE: 65 MMHG | SYSTOLIC BLOOD PRESSURE: 105 MMHG

## 2022-12-05 LAB
BASOPHILS # BLD AUTO: 0 K/UL (ref 0–0.2)
BASOPHILS NFR BLD AUTO: 0.4 % (ref 0–2)
BUN SERPL-MCNC: 9 MG/DL (ref 7–18)
CALCIUM SERPL-MCNC: 7.1 MG/DL (ref 8.5–10.1)
CHLORIDE SERPL-SCNC: 104 MMOL/L (ref 98–107)
CO2 SERPL-SCNC: 30 MMOL/L (ref 21–32)
CREAT SERPL-MCNC: 0.7 MG/DL (ref 0.6–1.3)
EOSINOPHIL NFR BLD AUTO: 0.4 % (ref 0–6)
GLUCOSE SERPL-MCNC: 78 MG/DL (ref 74–106)
HCT VFR BLD AUTO: 21 % (ref 33–45)
HGB BLD-MCNC: 7 G/DL (ref 11.5–14.8)
LYMPHOCYTES NFR BLD AUTO: 0.8 K/UL (ref 0.8–4.8)
LYMPHOCYTES NFR BLD AUTO: 12.7 % (ref 20–44)
MAGNESIUM SERPL-MCNC: 2 MG/DL (ref 1.8–2.4)
MCHC RBC AUTO-ENTMCNC: 34 G/DL (ref 31–36)
MCV RBC AUTO: 88 FL (ref 82–100)
MONOCYTES NFR BLD AUTO: 0.5 K/UL (ref 0.1–1.3)
MONOCYTES NFR BLD AUTO: 7.8 % (ref 2–12)
NEUTROPHILS # BLD AUTO: 5.2 K/UL (ref 1.8–8.9)
NEUTROPHILS NFR BLD AUTO: 78.7 % (ref 43–81)
PHOSPHATE SERPL-MCNC: 3 MG/DL (ref 2.5–4.9)
PLATELET # BLD AUTO: 285 K/UL (ref 150–450)
POTASSIUM SERPL-SCNC: 3.5 MMOL/L (ref 3.5–5.1)
RBC # BLD AUTO: 2.35 MIL/UL (ref 4–5.2)
SODIUM SERPL-SCNC: 138 MMOL/L (ref 136–145)
WBC NRBC COR # BLD AUTO: 6.5 K/UL (ref 4.3–11)

## 2022-12-05 RX ADMIN — ACETAMINOPHEN PRN MG: 325 TABLET ORAL at 02:16

## 2022-12-05 RX ADMIN — PANTOPRAZOLE SODIUM SCH MG: 40 TABLET, DELAYED RELEASE ORAL at 09:01

## 2022-12-05 RX ADMIN — SODIUM HYPOCHLORITE SCH ML: 5 SOLUTION TOPICAL at 09:00

## 2022-12-05 RX ADMIN — DEXTROSE MONOHYDRATE SCH GM: 50 INJECTION, SOLUTION INTRAVENOUS at 09:02

## 2022-12-05 RX ADMIN — Medication SCH ML: at 16:47

## 2022-12-05 RX ADMIN — SODIUM CHLORIDE PRN MLS/HR: 9 INJECTION, SOLUTION INTRAVENOUS at 17:14

## 2022-12-05 RX ADMIN — PIPERACILLIN SODIUM AND TAZOBACTAM SODIUM SCH MLS/HR: .25; 2 INJECTION, POWDER, LYOPHILIZED, FOR SOLUTION INTRAVENOUS at 05:25

## 2022-12-05 RX ADMIN — DEXTROSE MONOHYDRATE SCH GM: 50 INJECTION, SOLUTION INTRAVENOUS at 13:05

## 2022-12-05 RX ADMIN — DEXTROSE MONOHYDRATE SCH GM: 50 INJECTION, SOLUTION INTRAVENOUS at 21:36

## 2022-12-05 RX ADMIN — DEXTROSE MONOHYDRATE SCH MLS/HR: 50 INJECTION, SOLUTION INTRAVENOUS at 00:27

## 2022-12-05 RX ADMIN — Medication SCH ML: at 12:19

## 2022-12-05 RX ADMIN — Medication SCH ML: at 08:58

## 2022-12-05 NOTE — NUR
RN OPENING NOTE



PATIENT ALERT TO NAME AND TIME. REORIENTED TO PLACE AND SITUATION. UNLABORED BREATHING 
SATING AT 98  % AT ROOM AIR.  IVF OF NS 75 ML/HR. IV SITE ON LEFT UPPER ARM G 22, PATENT NO 
S/S OF COMPLICATIONS. BILATERAL HALF SIDE RAILS UP X2. HOB ELEVATED SEMI FOWLERS POSITION. 
BED IN LOW POSITION, LOCKED AND EXIT ALARM ON. CALL LIGHT IN REACH. RLE WITH DRESSING ON. 
RLE DRESSING WITH MODERATE DRAINAGE SEROSANGUINEOUS, AND SOILED, REFUSED WOUND CARE, PER 
NIGHTSHIFT RN. WILL FOLLOW UP ON WOUND CARE ONCE PATIENT HAS HAD SOME TIME TO REST. WILL 
CONTINUE PLAN OF CARE AND ANTICIPATE NEEDS.

## 2022-12-05 NOTE — NUR
PATIENT REFUSING WOUND CARE. PROVIDED EDUCATION ON THE IMPORTANCE OF KEEPING WOUNDS CLEAN TO 
PREVENT INFECTION. PATIENT STILL CONTINUES TO REFUSE. WILL CONTINUE PLAN OF CARE AND 
ANTICIPATE NEEDS.

## 2022-12-05 NOTE — NUR
PATIENT REFUSED BED BATH. PATIENT TOLD CNA KIKO, "I CAN CLEAN MYSELF". CNA PROVIDED SOAP 
LOTION AND WASH CLOTHS.

## 2022-12-05 NOTE — NUR
RN OPENING NOTE



RECEIVED PT IN BED, AWAKE. PT IS A/O X 3-4, ABLE TO VERBALIZE NEEDS. ON RA, TOLERATING WELL. 
NO S/SX OF ACUTE RESPI DISTRESS NOTED AT THIS TIME. IV LINE NOTED ON NEREYDA, #22g RUNNING NS @ 
75 CC/HR. PATENT AND INTACT. PT REFUSED TO HAVE HER WOUND ON R LEG BE SEEN, LET ALONE BE 
TOUCHED OR CLEANED.



ALL SAFETY MEASURES IN PLACE: BED LOCKED IN LOW POSITION, BED ALARM ON. CALL LIGHT WITHIN 
REACH. WILL CONTINUE TO MONITOR PT T/O THE NIGHT.

## 2022-12-05 NOTE — NUR
MED SURGE CLOSING NOTE: ALERT TO NAME AND TIME. REORIENTED TO PLACE AND SITUATION. UNLABORED 
BREATHING SATING AT 98  % AT ROOM AIR.  IVF OF NS 75 ML/HR. IV SITE ON LEFT UPPER ARM G 22, 
PATENT NO S/S OF COMPLICATIONS. RLE WITH DRESSING ON. BILATERAL HALF SIDE RAILS UP X2. HOB 
ELEVATED SEMI FOWLERS POSITION. BED IN LOW POSITION, LOCKED AND EXIT ALARM ON. CALL LIGHT IN 
REACH. RLE DRESSING WITH MODERATE DRAINAGE SEROSANGUINEOUS, AND SOILED, REFUSED WOUND CARE, 
RISKS VS BENEFITS EXPLAINED AND VERBALIZED UNDERSTANDING AND SILL REFUSED. OFFERED TIMES 
THREE, RESIDENT STARTS TO YELL WHEN APPROACHED TO PROVIDE WOUND CARE, C/O PAIN 9/10 BUT 
REFUSED MORPHINE, NORCO. TYLENOL GIVEN AS REQUESTED. PATIENT STATED "I WANT OT REST, I DO 
NOT WANT TO BE BOTHERED."   "I WILL HAVE IT CLEANED IN AM."

## 2022-12-05 NOTE — NUR
RN CLOSING NOTE



PATIENT REMAINS IN ROOM. UNLABORED BREATHING SATING AT 98  % AT ROOM AIR.  IVF OF NS 75 
ML/HR. IV SITE ON LEFT UPPER ARM G 22, PATENT NO S/S OF COMPLICATIONS. BILATERAL HALF SIDE 
RAILS UP X2. HOB ELEVATED SEMI FOWLERS POSITION. BED IN LOW POSITION, LOCKED AND EXIT ALARM 
ON. CALL LIGHT IN REACH. RLE WITH DRESSING ON. RLE DRESSING WITH MODERATE DRAINAGE 
SEROSANGUINEOUS, AND SOILED, REFUSED WOUND CARE, THROUGHOUT SHIFT. WILL ENDORSE TO 
NIGHTSHIFT RN FOR CONTINUATION OF CARE.

## 2022-12-06 VITALS — SYSTOLIC BLOOD PRESSURE: 101 MMHG | DIASTOLIC BLOOD PRESSURE: 53 MMHG

## 2022-12-06 VITALS — SYSTOLIC BLOOD PRESSURE: 109 MMHG | DIASTOLIC BLOOD PRESSURE: 75 MMHG

## 2022-12-06 VITALS — DIASTOLIC BLOOD PRESSURE: 55 MMHG | SYSTOLIC BLOOD PRESSURE: 93 MMHG

## 2022-12-06 LAB
BASOPHILS # BLD AUTO: 0 K/UL (ref 0–0.2)
BASOPHILS NFR BLD AUTO: 0.2 % (ref 0–2)
BUN SERPL-MCNC: 10 MG/DL (ref 7–18)
CALCIUM SERPL-MCNC: 7.2 MG/DL (ref 8.5–10.1)
CHLORIDE SERPL-SCNC: 107 MMOL/L (ref 98–107)
CO2 SERPL-SCNC: 30 MMOL/L (ref 21–32)
CREAT SERPL-MCNC: 0.6 MG/DL (ref 0.6–1.3)
EOSINOPHIL NFR BLD AUTO: 0.5 % (ref 0–6)
GLUCOSE SERPL-MCNC: 74 MG/DL (ref 74–106)
HCT VFR BLD AUTO: 21 % (ref 33–45)
HGB BLD-MCNC: 7.1 G/DL (ref 11.5–14.8)
LYMPHOCYTES NFR BLD AUTO: 0.9 K/UL (ref 0.8–4.8)
LYMPHOCYTES NFR BLD AUTO: 19.5 % (ref 20–44)
MAGNESIUM SERPL-MCNC: 1.7 MG/DL (ref 1.8–2.4)
MCHC RBC AUTO-ENTMCNC: 33 G/DL (ref 31–36)
MCV RBC AUTO: 90 FL (ref 82–100)
MONOCYTES NFR BLD AUTO: 0.5 K/UL (ref 0.1–1.3)
MONOCYTES NFR BLD AUTO: 10.6 % (ref 2–12)
NEUTROPHILS # BLD AUTO: 3.3 K/UL (ref 1.8–8.9)
NEUTROPHILS NFR BLD AUTO: 69.2 % (ref 43–81)
PHOSPHATE SERPL-MCNC: 2.2 MG/DL (ref 2.5–4.9)
PLATELET # BLD AUTO: 285 K/UL (ref 150–450)
POTASSIUM SERPL-SCNC: 3.5 MMOL/L (ref 3.5–5.1)
RBC # BLD AUTO: 2.38 MIL/UL (ref 4–5.2)
SODIUM SERPL-SCNC: 142 MMOL/L (ref 136–145)
WBC NRBC COR # BLD AUTO: 4.8 K/UL (ref 4.3–11)

## 2022-12-06 RX ADMIN — SODIUM HYPOCHLORITE SCH ML: 5 SOLUTION TOPICAL at 09:00

## 2022-12-06 RX ADMIN — Medication SCH ML: at 12:04

## 2022-12-06 RX ADMIN — Medication SCH ML: at 08:45

## 2022-12-06 RX ADMIN — Medication SCH ML: at 16:12

## 2022-12-06 RX ADMIN — BENZTROPINE MESYLATE SCH MG: 1 TABLET ORAL at 21:22

## 2022-12-06 RX ADMIN — DEXTROSE MONOHYDRATE SCH GM: 50 INJECTION, SOLUTION INTRAVENOUS at 04:18

## 2022-12-06 RX ADMIN — MAGNESIUM SULFATE IN DEXTROSE SCH MLS/HR: 10 INJECTION, SOLUTION INTRAVENOUS at 10:57

## 2022-12-06 RX ADMIN — PANTOPRAZOLE SODIUM SCH MG: 40 TABLET, DELAYED RELEASE ORAL at 08:45

## 2022-12-06 RX ADMIN — DEXTROSE MONOHYDRATE SCH MLS/HR: 50 INJECTION, SOLUTION INTRAVENOUS at 14:33

## 2022-12-06 RX ADMIN — DEXTROSE MONOHYDRATE SCH MLS/HR: 50 INJECTION, SOLUTION INTRAVENOUS at 21:21

## 2022-12-06 RX ADMIN — RISPERIDONE SCH MG: 0.5 TABLET, ORALLY DISINTEGRATING ORAL at 21:22

## 2022-12-06 RX ADMIN — ACETAMINOPHEN PRN MG: 325 TABLET ORAL at 21:37

## 2022-12-06 RX ADMIN — SODIUM CHLORIDE PRN MLS/HR: 9 INJECTION, SOLUTION INTRAVENOUS at 21:35

## 2022-12-06 RX ADMIN — MAGNESIUM SULFATE IN DEXTROSE SCH MLS/HR: 10 INJECTION, SOLUTION INTRAVENOUS at 12:04

## 2022-12-06 RX ADMIN — CLINDAMYCIN HYDROCHLORIDE SCH MG: 150 CAPSULE ORAL at 21:35

## 2022-12-06 RX ADMIN — CLINDAMYCIN HYDROCHLORIDE SCH MG: 150 CAPSULE ORAL at 14:45

## 2022-12-06 RX ADMIN — SODIUM CHLORIDE PRN MLS/HR: 9 INJECTION, SOLUTION INTRAVENOUS at 06:13

## 2022-12-06 NOTE — NUR
MED SURGE OPEN NOTE: ALERT TO NAME AND TIME. REORIENTED TO PLACE AND SITUATION. UNLABORED 
BREATHING SATING AT 98 % AT ROOM AIR.  IVF OF NS 75 ML/HR. IV SITE ON LEFT UPPER ARM G 22, 
PATENT NO S/S OF COMPLICATIONS. RLE WITH DRESSING ON. BILATERAL HALF SIDE RAILS UP X2. HOB 
ELEVATED SEMI FOWLERS POSITION. BED IN LOW POSITION, LOCKED AND EXIT ALARM ON. CALL LIGHT IN 
REACH. REFUSED WOUND CARE ON RLE, RISK VS BENEFITS EXPLAINED OFFERED THREE AND STILL 
REFUSEDL

## 2022-12-06 NOTE — NUR
RN NOTE



NO SIGNIFICANT CHANGE T/O THE NIGHT. PT STILL REFUSED TO HAVE HER R LEG WOUND DRESSING 
CHANGED. ALL DUE MEDS GIVEN. LINEN CHANGED. TURNED AND REPOSITIONED. PM CARE DONE.



WILL ENDORSE TO AM SHIFT NURSE FOR RAUL.

## 2022-12-07 VITALS — SYSTOLIC BLOOD PRESSURE: 109 MMHG | DIASTOLIC BLOOD PRESSURE: 61 MMHG

## 2022-12-07 VITALS — SYSTOLIC BLOOD PRESSURE: 101 MMHG | DIASTOLIC BLOOD PRESSURE: 71 MMHG

## 2022-12-07 VITALS — SYSTOLIC BLOOD PRESSURE: 107 MMHG | DIASTOLIC BLOOD PRESSURE: 59 MMHG

## 2022-12-07 VITALS — SYSTOLIC BLOOD PRESSURE: 108 MMHG | DIASTOLIC BLOOD PRESSURE: 60 MMHG

## 2022-12-07 LAB
BASOPHILS # BLD AUTO: 0 K/UL (ref 0–0.2)
BASOPHILS NFR BLD AUTO: 0.6 % (ref 0–2)
BUN SERPL-MCNC: 9 MG/DL (ref 7–18)
CALCIUM SERPL-MCNC: 7.2 MG/DL (ref 8.5–10.1)
CHLORIDE SERPL-SCNC: 105 MMOL/L (ref 98–107)
CO2 SERPL-SCNC: 30 MMOL/L (ref 21–32)
CREAT SERPL-MCNC: 0.7 MG/DL (ref 0.6–1.3)
EOSINOPHIL NFR BLD AUTO: 0.3 % (ref 0–6)
EOSINOPHIL NFR BLD MANUAL: 1 % (ref 0–4)
GLUCOSE SERPL-MCNC: 85 MG/DL (ref 74–106)
HCT VFR BLD AUTO: 22 % (ref 33–45)
HGB BLD-MCNC: 7.5 G/DL (ref 11.5–14.8)
LYMPHOCYTES NFR BLD AUTO: 0.9 K/UL (ref 0.8–4.8)
LYMPHOCYTES NFR BLD AUTO: 17.1 % (ref 20–44)
LYMPHOCYTES NFR BLD MANUAL: 17 % (ref 16–48)
MAGNESIUM SERPL-MCNC: 1.8 MG/DL (ref 1.8–2.4)
MCHC RBC AUTO-ENTMCNC: 33 G/DL (ref 31–36)
MCV RBC AUTO: 89 FL (ref 82–100)
MONOCYTES NFR BLD AUTO: 0.8 K/UL (ref 0.1–1.3)
MONOCYTES NFR BLD AUTO: 15.3 % (ref 2–12)
MONOCYTES NFR BLD MANUAL: 12 % (ref 0–11)
NEUTROPHILS # BLD AUTO: 3.4 K/UL (ref 1.8–8.9)
NEUTROPHILS NFR BLD AUTO: 66.7 % (ref 43–81)
NEUTS BAND NFR BLD MANUAL: 3 % (ref 0–5)
NEUTS SEG NFR BLD MANUAL: 67 % (ref 42–76)
PHOSPHATE SERPL-MCNC: 1.9 MG/DL (ref 2.5–4.9)
PLATELET # BLD AUTO: 288 K/UL (ref 150–450)
POTASSIUM SERPL-SCNC: 3.5 MMOL/L (ref 3.5–5.1)
RBC # BLD AUTO: 2.52 MIL/UL (ref 4–5.2)
SODIUM SERPL-SCNC: 139 MMOL/L (ref 136–145)
WBC NRBC COR # BLD AUTO: 5 K/UL (ref 4.3–11)

## 2022-12-07 PROCEDURE — 05HA33Z INSERTION OF INFUSION DEVICE INTO LEFT BRACHIAL VEIN, PERCUTANEOUS APPROACH: ICD-10-PCS | Performed by: NURSE PRACTITIONER

## 2022-12-07 RX ADMIN — Medication SCH ML: at 12:00

## 2022-12-07 RX ADMIN — DEXTROSE MONOHYDRATE SCH MLS/HR: 50 INJECTION, SOLUTION INTRAVENOUS at 05:00

## 2022-12-07 RX ADMIN — SODIUM HYPOCHLORITE SCH ML: 5 SOLUTION TOPICAL at 09:18

## 2022-12-07 RX ADMIN — Medication SCH ML: at 17:50

## 2022-12-07 RX ADMIN — CLINDAMYCIN HYDROCHLORIDE SCH MG: 150 CAPSULE ORAL at 21:52

## 2022-12-07 RX ADMIN — Medication SCH ML: at 08:55

## 2022-12-07 RX ADMIN — BENZTROPINE MESYLATE SCH MG: 1 TABLET ORAL at 09:02

## 2022-12-07 RX ADMIN — BENZTROPINE MESYLATE SCH MG: 1 TABLET ORAL at 21:53

## 2022-12-07 RX ADMIN — CLINDAMYCIN HYDROCHLORIDE SCH MG: 150 CAPSULE ORAL at 13:55

## 2022-12-07 RX ADMIN — DEXTROSE MONOHYDRATE SCH MLS/HR: 50 INJECTION, SOLUTION INTRAVENOUS at 13:54

## 2022-12-07 RX ADMIN — RISPERIDONE SCH MG: 0.5 TABLET, ORALLY DISINTEGRATING ORAL at 09:18

## 2022-12-07 RX ADMIN — PANTOPRAZOLE SODIUM SCH MG: 40 TABLET, DELAYED RELEASE ORAL at 09:19

## 2022-12-07 RX ADMIN — DEXTROSE MONOHYDRATE SCH MLS/HR: 50 INJECTION, SOLUTION INTRAVENOUS at 21:52

## 2022-12-07 RX ADMIN — CLINDAMYCIN HYDROCHLORIDE SCH MG: 150 CAPSULE ORAL at 05:02

## 2022-12-07 RX ADMIN — RISPERIDONE SCH MG: 0.5 TABLET, ORALLY DISINTEGRATING ORAL at 21:53

## 2022-12-07 NOTE — NUR
med surge rn closing note

patient in bed, positioned comfortable, oxygenation 97% on room air, no pain complain. pt 
scheduled NPO after midnight for morning debridement procedure at a surgery. right foot 
dressing changed. bed is in low position, safety measures implemented.

## 2022-12-07 NOTE — NUR
MS RN CLOSING NOTE



PATIENT REMAINS IN ROOM. UNLABORED BREATHING SATING AT 98% AT ROOM AIR.  IVF OF NS 75 ML/HR. 
IV SITE ON LEFT UPPER ARM G 22, PATENT NO S/S OF COMPLICATIONS. BILATERAL HALF SIDE RAILS UP 
X2. ALL DUE MEDS GIVEN, KEPT DRY AND CLEAN, HOB ELEVATED SEMI FOWLERS POSITION. BED IN LOW 
POSITION, LOCKED AND EXIT ALARM ON. CALL LIGHT IN REACH. RLE WITH DRESSING ON, WOUND CARE 
DONE PT TOLERATED WELL, WILL ENDORSE TO AM SHIFT NURSE RN FOR CONTINUATION OF CARE.

## 2022-12-07 NOTE — NUR
RN OPENING NOTE



PT A&OX2. SKIN IS WARM AND DRY. ODOR NOTED FROM RLE. RESPIRATIONS EVEN AND UNLABORED ON RA. 
NEREYDA MIDLINE INTACT AND RUNNING NS @ 75 ML/HR. PT TO REMAIN NPO AFTER MIDNIGHT. NO ACUTE 
SIGNS OF DISTRESS. BED LOCKED AND AT LOWEST LEVEL WITH 2 RAILS UP. CALL LIGHT WITHIN REACH.

## 2022-12-07 NOTE — NUR
patient refused to sign procedure and anaesthesia consents. 

GAY Cooper notified, response was: "thats fine she doesn't have to sign consent she has no 
capacity to make medical decisions"

## 2022-12-07 NOTE — NUR
med surge rn opening note

PATIENT REMAINS IN ROOM. UNLABORED BREATHING SATING AT 98% AT ROOM AIR.  IV SITE ON LEFT 
UPPER ARM G 22, PATENT NO S/S OF COMPLICATIONS. ALL DUE MEDS GIVEN, KEPT DRY AND CLEAN, HOB 
ELEVATED SEMI FOWLERS POSITION. BED IN LOW POSITION, LOCKED AND EXIT ALARM ON. CALL LIGHT IN 
REACH. WILL CONTINUE MONITOR

## 2022-12-08 VITALS — DIASTOLIC BLOOD PRESSURE: 63 MMHG | SYSTOLIC BLOOD PRESSURE: 111 MMHG

## 2022-12-08 VITALS — DIASTOLIC BLOOD PRESSURE: 54 MMHG | SYSTOLIC BLOOD PRESSURE: 95 MMHG

## 2022-12-08 VITALS — DIASTOLIC BLOOD PRESSURE: 83 MMHG | SYSTOLIC BLOOD PRESSURE: 110 MMHG

## 2022-12-08 LAB
ALBUMIN SERPL BCP-MCNC: 1.3 G/DL (ref 3.4–5)
ALP SERPL-CCNC: 48 U/L (ref 46–116)
ALT SERPL W P-5'-P-CCNC: 11 U/L (ref 12–78)
AST SERPL W P-5'-P-CCNC: 13 U/L (ref 15–37)
BASOPHILS # BLD AUTO: 0 K/UL (ref 0–0.2)
BASOPHILS NFR BLD AUTO: 1.1 % (ref 0–2)
BILIRUB SERPL-MCNC: 0.2 MG/DL (ref 0.2–1)
BUN SERPL-MCNC: 9 MG/DL (ref 7–18)
CALCIUM SERPL-MCNC: 7.4 MG/DL (ref 8.5–10.1)
CHLORIDE SERPL-SCNC: 106 MMOL/L (ref 98–107)
CO2 SERPL-SCNC: 30 MMOL/L (ref 21–32)
CREAT SERPL-MCNC: 0.6 MG/DL (ref 0.6–1.3)
EOSINOPHIL NFR BLD AUTO: 0.7 % (ref 0–6)
FERRITIN SERPL-MCNC: 229 NG/ML (ref 8–388)
GLUCOSE SERPL-MCNC: 71 MG/DL (ref 74–106)
HCT VFR BLD AUTO: 21 % (ref 33–45)
HGB BLD-MCNC: 7.1 G/DL (ref 11.5–14.8)
IRON SERPL-MCNC: 14 UG/DL (ref 50–175)
LYMPHOCYTES NFR BLD AUTO: 1.2 K/UL (ref 0.8–4.8)
LYMPHOCYTES NFR BLD AUTO: 29.3 % (ref 20–44)
LYMPHOCYTES NFR BLD MANUAL: 30 % (ref 16–48)
MAGNESIUM SERPL-MCNC: 1.7 MG/DL (ref 1.8–2.4)
MCHC RBC AUTO-ENTMCNC: 34 G/DL (ref 31–36)
MCV RBC AUTO: 89 FL (ref 82–100)
MONOCYTES NFR BLD AUTO: 0.6 K/UL (ref 0.1–1.3)
MONOCYTES NFR BLD AUTO: 15 % (ref 2–12)
MONOCYTES NFR BLD MANUAL: 14 % (ref 0–11)
NEUTROPHILS # BLD AUTO: 2.1 K/UL (ref 1.8–8.9)
NEUTROPHILS NFR BLD AUTO: 53.9 % (ref 43–81)
NEUTS BAND NFR BLD MANUAL: 16 % (ref 0–5)
NEUTS SEG NFR BLD MANUAL: 40 % (ref 42–76)
PHOSPHATE SERPL-MCNC: 2.6 MG/DL (ref 2.5–4.9)
PLATELET # BLD AUTO: 261 K/UL (ref 150–450)
POTASSIUM SERPL-SCNC: 3.4 MMOL/L (ref 3.5–5.1)
PROT SERPL-MCNC: 5.6 G/DL (ref 6.4–8.2)
RBC # BLD AUTO: 2.36 MIL/UL (ref 4–5.2)
SODIUM SERPL-SCNC: 139 MMOL/L (ref 136–145)
TIBC SERPL-MCNC: 119 UG/DL (ref 250–450)
WBC NRBC COR # BLD AUTO: 3.9 K/UL (ref 4.3–11)

## 2022-12-08 PROCEDURE — 0KBS0ZZ EXCISION OF RIGHT LOWER LEG MUSCLE, OPEN APPROACH: ICD-10-PCS | Performed by: PODIATRIST

## 2022-12-08 RX ADMIN — MAGNESIUM SULFATE IN DEXTROSE SCH MLS/HR: 10 INJECTION, SOLUTION INTRAVENOUS at 19:02

## 2022-12-08 RX ADMIN — CLINDAMYCIN HYDROCHLORIDE SCH MG: 150 CAPSULE ORAL at 05:00

## 2022-12-08 RX ADMIN — CLINDAMYCIN HYDROCHLORIDE SCH MG: 150 CAPSULE ORAL at 20:54

## 2022-12-08 RX ADMIN — PANTOPRAZOLE SODIUM SCH MG: 40 TABLET, DELAYED RELEASE ORAL at 07:30

## 2022-12-08 RX ADMIN — DEXTROSE MONOHYDRATE SCH MLS/HR: 50 INJECTION, SOLUTION INTRAVENOUS at 05:09

## 2022-12-08 RX ADMIN — Medication SCH ML: at 17:00

## 2022-12-08 RX ADMIN — RISPERIDONE SCH MG: 0.5 TABLET, ORALLY DISINTEGRATING ORAL at 20:55

## 2022-12-08 RX ADMIN — BENZTROPINE MESYLATE SCH MG: 1 TABLET ORAL at 08:13

## 2022-12-08 RX ADMIN — SODIUM CHLORIDE SCH MLS/HR: 9 INJECTION, SOLUTION INTRAVENOUS at 18:32

## 2022-12-08 RX ADMIN — DEXTROSE MONOHYDRATE SCH MLS/HR: 50 INJECTION, SOLUTION INTRAVENOUS at 14:01

## 2022-12-08 RX ADMIN — SODIUM CHLORIDE PRN MLS/HR: 9 INJECTION, SOLUTION INTRAVENOUS at 14:55

## 2022-12-08 RX ADMIN — CLINDAMYCIN HYDROCHLORIDE SCH MG: 150 CAPSULE ORAL at 15:02

## 2022-12-08 RX ADMIN — RISPERIDONE SCH MG: 0.5 TABLET, ORALLY DISINTEGRATING ORAL at 08:13

## 2022-12-08 RX ADMIN — Medication SCH ML: at 12:00

## 2022-12-08 RX ADMIN — SODIUM HYPOCHLORITE SCH ML: 5 SOLUTION TOPICAL at 11:34

## 2022-12-08 RX ADMIN — Medication SCH ML: at 08:00

## 2022-12-08 RX ADMIN — DEXTROSE MONOHYDRATE SCH MLS/HR: 50 INJECTION, SOLUTION INTRAVENOUS at 20:52

## 2022-12-08 RX ADMIN — BENZTROPINE MESYLATE SCH MG: 1 TABLET ORAL at 20:54

## 2022-12-08 RX ADMIN — MAGNESIUM SULFATE IN DEXTROSE SCH MLS/HR: 10 INJECTION, SOLUTION INTRAVENOUS at 13:59

## 2022-12-08 NOTE — NUR
RN CLOSING NOTE



PT IS ALERT AND ORIENTED X1-2. PT IN BED. NO SIGNS OF PAIN OR DISCOMFORT AT THIS TIME. PT 
HAS RIGHT UPPER ARM MIDLINE. IV INTACT, PATENT AND FLUSHING WELL. PT ON ROOM AIR TOLERATING 
WELL. PT HAS RIGHT LOWER EXTREMITIY DRESSING.DRESSING CLEAN AND INTACT.ALL SAFETY MEASURES 
IN PLACE.CALL LIGHT WITHIN REACH.BED LOCKED AT LOWEST POSITION. SIDE RAILS UP X2. ENDORSED 
TO NIGHT SHIFT RN FOR CONTINUITY OF CARE

## 2022-12-08 NOTE — NUR
RN NOTE



PT RETURNED FROM SURGERY. VITAL SIGNS WITHIN NORMAL RANGE. NO SIGNS OF PAIN OR DISCOMFORT

## 2022-12-08 NOTE — NUR
called pharmacy to switch potassium chloride to tablet. pt cant swallow whole pills, asked 
to change to powder packet.paulie said ok

## 2022-12-08 NOTE — NUR
RN NOTE





CALLED PHARMACY TO ASK IF CAN BRING UP MAGNESIUM IV. PHARMACY BROUGHT UP NEW BAG AND 
ADMINSTERED TO PATIENT

## 2022-12-08 NOTE — NUR
RN NOTES





RECEIVED REPORT FROM REGISTRY. PATIENT IN BED A/O X2 ABLE TO MAKE NEEDS KNOWN. ON ROOM AIR 
SATING 99% NO SOB NO DISTRESS NOTED AT THIS TIME. WITH IV ACCESS AT R UA MIDLINE PATENT 
FLUSHES WELL WITH IVF RUNNING NS@ 75 ML/HR. S/P DEBRIDEMENT NO BLEEDING NOTED AT THE SITE. 
ALL SAFETY MEASURES IN PLACE AT ALL TIMES. HOB ELEVATED. CALL LIGHT WITHIN REACH. WILL 
CLOSELY MONITOR THE PATIENT

## 2022-12-08 NOTE — NUR
RN CLOSING NOTE



PT A&OX2 AND CONFUSED. PT HAS BEEN NPO SINCE MIDNIGHT. NEREYDA 22G INTACT AND PATENT. SKIN IS 
WARM AND DRY. ODOR NOTED FROM RLE. SPOKE WITH JORGE FROM SURGERY AND ORDERED TYPE AND 
SCREEN. SHE STATED THAT PROCEDURE MUST EITHER BE APPROVED BY SURGEON OR FLOOR PHYSICIAN FOR 
PROCEDURE TO PROCEED. NO ACUTE SIGNS OF DISTRESS. BED LOCKED AND AT LOWEST LEVEL. X2 RAILS 
UP AND CALL LIGHT WITHIN REACH.

## 2022-12-08 NOTE — NUR
RN OPENING NOTE



PT IS ALERT AND ORIENTED X1-2. PT IN BED. NO SIGNS OF PAIN OR DISCOMFORT AT THIS TIME. PT 
HAS RIGHT UPPER ARM MIDLINE. IV INTACT, PATENT AND FLUSHING WELL. PT ON ROOM AIR TOLERATING 
WELL. PT HAS RIGHT LOWER EXTREMITY DRESSING.DRESSING CLEAN AND INTACT.ALL SAFETY MEASURES IN 
PLACE.CALL LIGHT WITHIN REACH.BED LOCKED AT LOWEST POSITION. SIDE RAILS UP X2.

## 2022-12-09 VITALS — SYSTOLIC BLOOD PRESSURE: 113 MMHG | DIASTOLIC BLOOD PRESSURE: 60 MMHG

## 2022-12-09 VITALS — DIASTOLIC BLOOD PRESSURE: 55 MMHG | SYSTOLIC BLOOD PRESSURE: 92 MMHG

## 2022-12-09 VITALS — SYSTOLIC BLOOD PRESSURE: 102 MMHG | DIASTOLIC BLOOD PRESSURE: 56 MMHG

## 2022-12-09 VITALS — DIASTOLIC BLOOD PRESSURE: 61 MMHG | SYSTOLIC BLOOD PRESSURE: 96 MMHG

## 2022-12-09 VITALS — DIASTOLIC BLOOD PRESSURE: 55 MMHG | SYSTOLIC BLOOD PRESSURE: 107 MMHG

## 2022-12-09 VITALS — SYSTOLIC BLOOD PRESSURE: 94 MMHG | DIASTOLIC BLOOD PRESSURE: 51 MMHG

## 2022-12-09 VITALS — DIASTOLIC BLOOD PRESSURE: 65 MMHG | SYSTOLIC BLOOD PRESSURE: 101 MMHG

## 2022-12-09 VITALS — SYSTOLIC BLOOD PRESSURE: 91 MMHG | DIASTOLIC BLOOD PRESSURE: 78 MMHG

## 2022-12-09 VITALS — DIASTOLIC BLOOD PRESSURE: 45 MMHG | SYSTOLIC BLOOD PRESSURE: 92 MMHG

## 2022-12-09 VITALS — DIASTOLIC BLOOD PRESSURE: 46 MMHG | SYSTOLIC BLOOD PRESSURE: 99 MMHG

## 2022-12-09 VITALS — DIASTOLIC BLOOD PRESSURE: 54 MMHG | SYSTOLIC BLOOD PRESSURE: 95 MMHG

## 2022-12-09 VITALS — DIASTOLIC BLOOD PRESSURE: 68 MMHG | SYSTOLIC BLOOD PRESSURE: 94 MMHG

## 2022-12-09 VITALS — DIASTOLIC BLOOD PRESSURE: 56 MMHG | SYSTOLIC BLOOD PRESSURE: 95 MMHG

## 2022-12-09 LAB
BASOPHILS # BLD AUTO: 0.1 K/UL (ref 0–0.2)
BASOPHILS NFR BLD AUTO: 1.1 % (ref 0–2)
BUN SERPL-MCNC: 7 MG/DL (ref 7–18)
CALCIUM SERPL-MCNC: 7.2 MG/DL (ref 8.5–10.1)
CHLORIDE SERPL-SCNC: 106 MMOL/L (ref 98–107)
CO2 SERPL-SCNC: 30 MMOL/L (ref 21–32)
CREAT SERPL-MCNC: 0.5 MG/DL (ref 0.6–1.3)
EOSINOPHIL NFR BLD AUTO: 0.7 % (ref 0–6)
GLUCOSE SERPL-MCNC: 71 MG/DL (ref 74–106)
HCT VFR BLD AUTO: 16 % (ref 33–45)
HGB BLD-MCNC: 5.4 G/DL (ref 11.5–14.8)
LYMPHOCYTES NFR BLD AUTO: 1 K/UL (ref 0.8–4.8)
LYMPHOCYTES NFR BLD AUTO: 20.8 % (ref 20–44)
LYMPHOCYTES NFR BLD MANUAL: 25 % (ref 16–48)
MAGNESIUM SERPL-MCNC: 2 MG/DL (ref 1.8–2.4)
MCHC RBC AUTO-ENTMCNC: 35 G/DL (ref 31–36)
MCV RBC AUTO: 89 FL (ref 82–100)
MONOCYTES NFR BLD AUTO: 0.7 K/UL (ref 0.1–1.3)
MONOCYTES NFR BLD AUTO: 14.5 % (ref 2–12)
MONOCYTES NFR BLD MANUAL: 5 % (ref 0–11)
NEUTROPHILS # BLD AUTO: 3 K/UL (ref 1.8–8.9)
NEUTROPHILS NFR BLD AUTO: 62.9 % (ref 43–81)
NEUTS BAND NFR BLD MANUAL: 16 % (ref 0–5)
NEUTS SEG NFR BLD MANUAL: 54 % (ref 42–76)
PHOSPHATE SERPL-MCNC: 2.7 MG/DL (ref 2.5–4.9)
PLATELET # BLD AUTO: 207 K/UL (ref 150–450)
POTASSIUM SERPL-SCNC: 3.6 MMOL/L (ref 3.5–5.1)
RBC # BLD AUTO: 1.74 MIL/UL (ref 4–5.2)
SODIUM SERPL-SCNC: 139 MMOL/L (ref 136–145)
WBC NRBC COR # BLD AUTO: 4.7 K/UL (ref 4.3–11)

## 2022-12-09 PROCEDURE — 30233N1 TRANSFUSION OF NONAUTOLOGOUS RED BLOOD CELLS INTO PERIPHERAL VEIN, PERCUTANEOUS APPROACH: ICD-10-PCS | Performed by: NURSE PRACTITIONER

## 2022-12-09 RX ADMIN — DEXTROSE MONOHYDRATE SCH MLS/HR: 50 INJECTION, SOLUTION INTRAVENOUS at 08:10

## 2022-12-09 RX ADMIN — PANTOPRAZOLE SODIUM SCH MG: 40 TABLET, DELAYED RELEASE ORAL at 08:10

## 2022-12-09 RX ADMIN — Medication SCH ML: at 12:00

## 2022-12-09 RX ADMIN — DEXTROSE MONOHYDRATE SCH MLS/HR: 50 INJECTION, SOLUTION INTRAVENOUS at 15:05

## 2022-12-09 RX ADMIN — CLINDAMYCIN HYDROCHLORIDE SCH MG: 150 CAPSULE ORAL at 05:28

## 2022-12-09 RX ADMIN — Medication SCH ML: at 17:00

## 2022-12-09 RX ADMIN — DEXTROSE MONOHYDRATE SCH MLS/HR: 50 INJECTION, SOLUTION INTRAVENOUS at 05:28

## 2022-12-09 RX ADMIN — DEXTROSE MONOHYDRATE SCH MLS/HR: 50 INJECTION, SOLUTION INTRAVENOUS at 21:09

## 2022-12-09 RX ADMIN — RISPERIDONE SCH MG: 0.5 TABLET, ORALLY DISINTEGRATING ORAL at 21:08

## 2022-12-09 RX ADMIN — Medication SCH ML: at 08:00

## 2022-12-09 RX ADMIN — BENZTROPINE MESYLATE SCH MG: 1 TABLET ORAL at 08:10

## 2022-12-09 RX ADMIN — RISPERIDONE SCH MG: 0.5 TABLET, ORALLY DISINTEGRATING ORAL at 08:10

## 2022-12-09 RX ADMIN — BENZTROPINE MESYLATE SCH MG: 1 TABLET ORAL at 21:08

## 2022-12-09 RX ADMIN — DEXTROSE MONOHYDRATE SCH MLS/HR: 50 INJECTION, SOLUTION INTRAVENOUS at 13:22

## 2022-12-09 RX ADMIN — SODIUM HYPOCHLORITE SCH ML: 5 SOLUTION TOPICAL at 08:12

## 2022-12-09 RX ADMIN — SODIUM CHLORIDE PRN MLS/HR: 9 INJECTION, SOLUTION INTRAVENOUS at 12:07

## 2022-12-09 NOTE — NUR
rn note



15 min after bp transfusion bp 95/54, 02 98, temp 98.1, rr 16, hr 85. no allergic reaction, 
pt comfortable. no pain or discomofrt

## 2022-12-09 NOTE — NUR
RN NOTES





STARTED 2ND UNIT OF PRBC TYPE A POS. VITAL SIGNS TAKEN AND RECORDED WILL CLOSELY MONTOR THE 
PATIENT

## 2022-12-09 NOTE — NUR
rn note



notified dr. romero that patient refusing blood transfusion despite explaining it to her.

 responded if she refused we cant do anything

## 2022-12-09 NOTE — NUR
RN NOTES





MAINER FROM LAB CALL WITH CRITICAL RESULT HGB 5.4, HCT 16. DR FOX INFORMED [facility] ORDER TO 
TRANSFUSE 2 UNITS PRBC.

## 2022-12-09 NOTE — NUR
RN NOTES





RECEIVED REPORT FROM MORNING RN. PATIENT IN BED A/O X2 ABLE TO MAKE NEEDS KNOWN. ON ROOM AIR 
SATING 99% NO SOB NO DISTRESS NOTED AT THIS TIME. WITH IV ACCESS AT R UA MIDLINE PATENT 
FLUSHES WELL WITH ONGOING BLOOD TRANSFUSION NO TRANSFUSION REACTION NOTED. ALL SAFETY 
MEASURES IN PLACE AT ALL TIMES. HOB ELEVATED. CALL LIGHT WITHIN REACH. WILL CLOSELY MONITOR 
THE PATIENT

## 2022-12-09 NOTE — NUR
rn note 



1 hour after blood tranfusion vital signs bp 94/51, hr 86,02 100%, temp 98.0, rr 16



pt comfortable. no pain or discomfort at this time. no allergic reaction noted at this time. 
pt tolerating well at this time

## 2022-12-09 NOTE — NUR
CHARGE RN NOTES



PLACED A CALL VIA [email or web address] EXCHANGE TO DR. AMARILIS ANDRADE TO NOTIFY THAT PATIENT IS REFUSING BLOOD 
TRANSFUSION DESPITE EXPLAINING THE REASON. WAITING FOR FURTHER ORDER.

-------------------------------------------------------------------------------

Addendum: 12/09/22 at 1152 by BERONICA RIOS RN

-------------------------------------------------------------------------------

ADDENDUM;



PER DR. AMARILIS ANDRADE, WE CAN NOT DO ANYTHING IF PATIENT REFUSE BLOOD TRANSFUSION

## 2022-12-09 NOTE — NUR
rn note 



temp 97.1, bp 107/55, hr 89, 02 98%, temp 98.1, rr 16

patient tolerating well at this time.

## 2022-12-09 NOTE — NUR
DISCHARGE SUMMARY    NAME:Shani Sosa  : [id number]  MRN: [id number]    The patient Mireille Bernstein exhibits the ability to control behavior in a less restrictive environment. Patient's level of functioning is improving. No assaultive/destructive behavior has been observed for the past 24 hours. No suicidal/homicidal threat or behavior has been observed for the past 24 hours. There is no evidence of serious medication side effects. Patient has not been in physical or protective restraints for at least the past 24 hours. If weapons involved, how are they secured? No weapons involved. Is patient aware of and in agreement with discharge plan? Yes    Arrangements for medication:  Prescriptions given to patient. Copy of [facility] instructions to provider[facility]  [facility] [address] [facility]:  Ms. Jaydon Guerra to . Keep all follow up appointments as scheduled, continue to take prescribed medications per physician instructions. Mental health crisis number:  303 or your local mental health crisis line number at [phone]. DISCHARGE SUMMARY from Nurse    PATIENT INSTRUCTIONS:      What to do at Home:  Recommended activity: Activity as tolerated. If you experience any of the following symptoms:  Overwhelming anxiety or depression, thoughts of hurting yourself or others, please follow up with 911 or your local mental health crisis line number at [phone]. *  Please give a list of your current medications to your Primary Care Provider. *  Please update this list whenever your medications are discontinued, doses are      changed, or new medications (including over-the-counter products) are added. *  Please carry medication information at all times in case of emergency situations.     These are general instructions for a healthy lifestyle:    No smoking/ No tobacco products/ Avoid exposure to second hand smoke  Surgeon General's Warning:  Quitting smoking now RN closing NOTE



PT IS ALERT AND ORIENTED X1-2. PT IN BED. NO SIGNS OF PAIN OR DISCOMFORT AT THIS TIME. PT 
HAS RIGHT UPPER ARM MIDLINE. IV INTACT, PATENT AND FLUSHING WELL. PT ON ROOM AIR TOLERATING 
WELL above 92%.PT HAS RIGHT LOWER EXTREMITY DRESSING.DRESSING CLEAN,DRY AND INTACT.ALL 
SAFETY MEASURES IN PLACE.CALL LIGHT WITHIN REACH.BED LOCKED AT LOWEST POSITION. SIDE RAILS 
UP X2. endorsed to night shift rn for continue of care greatly reduces serious risk to your health. Obesity, smoking, and sedentary lifestyle greatly increases your risk for illness    A healthy diet, regular physical exercise & weight monitoring are important for maintaining a healthy lifestyle    You may be retaining fluid if you have a history of heart failure or if you experience any of the following symptoms:  Weight gain of 3 pounds or more overnight or 5 pounds in a week, increased swelling in our hands or feet or shortness of breath while lying flat in bed. Please call your doctor as soon as you notice any of these symptoms; do not wait until your next office visit. Recognize signs and symptoms of STROKE:    F-face looks uneven    A-arms unable to move or move unevenly    S-speech slurred or non-existent    T-time-call 911 as soon as signs and symptoms begin-DO NOT go       Back to bed or wait to see if you get better-TIME IS BRAIN. Warning Signs of HEART ATTACK     Call 911 if you have these symptoms:   Chest discomfort. Most heart attacks involve discomfort in the center of the chest that lasts more than a few minutes, or that goes away and comes back. It can feel like uncomfortable pressure, squeezing, fullness, or pain.  Discomfort in other areas of the upper body. Symptoms can include pain or discomfort in one or both arms, the back, neck, jaw, or stomach.  Shortness of breath with or without chest discomfort.  Other signs may include breaking out in a cold sweat, nausea, or lightheadedness. Don't wait more than five minutes to call 911 - MINUTES MATTER! Fast action can save your life. Calling 911 is almost always the fastest way to get lifesaving treatment. Emergency Medical Services staff can begin treatment when they arrive -- up to an hour sooner than if someone gets to the hospital by car. The discharge information has been reviewed with the patient. The patient verbalized understanding.   Discharge medications reviewed with the patient and appropriate educational materials and side effects teaching were provided.   ___________________________________________________________________________________________________________________________________

## 2022-12-09 NOTE — NUR
rn note



blood transfusion started before blood tranfusion bp 102/56, hr 82, 02 08%, temp 98.0

pt comfortable, no complaints of pain or discomfort

## 2022-12-09 NOTE — NUR
RN OPENING NOTE



PT IS ALERT AND ORIENTED X1-2. PT IN BED. NO SIGNS OF PAIN OR DISCOMFORT AT THIS TIME. PT 
HAS RIGHT UPPER ARM MIDLINE. IV INTACT, PATENT AND FLUSHING WELL. PT ON ROOM AIR TOLERATING 
WELL. PT HAS RIGHT LOWER EXTREMITY DRESSING.DRESSING CLEAN,DRY AND INTACT.ALL SAFETY 
MEASURES IN PLACE.CALL LIGHT WITHIN REACH.BED LOCKED AT LOWEST POSITION. SIDE RAILS UP X2.

## 2022-12-09 NOTE — NUR
rn note



spoke with blood bank.said they would call when blood is ready for pickup. endorsed to night 
shift rn to follow up

## 2022-12-09 NOTE — NUR
rn note



patient has sacral wound.refused for dressing to be changed.educated about benefits.pt still 
refused

## 2022-12-10 VITALS — DIASTOLIC BLOOD PRESSURE: 48 MMHG | SYSTOLIC BLOOD PRESSURE: 92 MMHG

## 2022-12-10 VITALS — SYSTOLIC BLOOD PRESSURE: 94 MMHG | DIASTOLIC BLOOD PRESSURE: 55 MMHG

## 2022-12-10 VITALS — SYSTOLIC BLOOD PRESSURE: 98 MMHG | DIASTOLIC BLOOD PRESSURE: 62 MMHG

## 2022-12-10 VITALS — DIASTOLIC BLOOD PRESSURE: 50 MMHG | SYSTOLIC BLOOD PRESSURE: 97 MMHG

## 2022-12-10 VITALS — SYSTOLIC BLOOD PRESSURE: 97 MMHG | DIASTOLIC BLOOD PRESSURE: 61 MMHG

## 2022-12-10 LAB
BASOPHILS # BLD AUTO: 0 K/UL (ref 0–0.2)
BASOPHILS NFR BLD AUTO: 0.6 % (ref 0–2)
BUN SERPL-MCNC: 10 MG/DL (ref 7–18)
CALCIUM SERPL-MCNC: 7.3 MG/DL (ref 8.5–10.1)
CHLORIDE SERPL-SCNC: 107 MMOL/L (ref 98–107)
CO2 SERPL-SCNC: 29 MMOL/L (ref 21–32)
CREAT SERPL-MCNC: 0.6 MG/DL (ref 0.6–1.3)
EOSINOPHIL NFR BLD AUTO: 0.7 % (ref 0–6)
EOSINOPHIL NFR BLD MANUAL: 1 % (ref 0–4)
GLUCOSE SERPL-MCNC: 79 MG/DL (ref 74–106)
HCT VFR BLD AUTO: 25 % (ref 33–45)
HGB BLD-MCNC: 8.3 G/DL (ref 11.5–14.8)
LYMPHOCYTES NFR BLD AUTO: 1.2 K/UL (ref 0.8–4.8)
LYMPHOCYTES NFR BLD AUTO: 21.8 % (ref 20–44)
LYMPHOCYTES NFR BLD MANUAL: 21 % (ref 16–48)
MAGNESIUM SERPL-MCNC: 1.8 MG/DL (ref 1.8–2.4)
MCHC RBC AUTO-ENTMCNC: 33 G/DL (ref 31–36)
MCV RBC AUTO: 89 FL (ref 82–100)
MONOCYTES NFR BLD AUTO: 1 K/UL (ref 0.1–1.3)
MONOCYTES NFR BLD AUTO: 17.6 % (ref 2–12)
MONOCYTES NFR BLD MANUAL: 13 % (ref 0–11)
NEUTROPHILS # BLD AUTO: 3.2 K/UL (ref 1.8–8.9)
NEUTROPHILS NFR BLD AUTO: 59.3 % (ref 43–81)
NEUTS BAND NFR BLD MANUAL: 16 % (ref 0–5)
NEUTS SEG NFR BLD MANUAL: 49 % (ref 42–76)
PHOSPHATE SERPL-MCNC: 2.5 MG/DL (ref 2.5–4.9)
PLATELET # BLD AUTO: 205 K/UL (ref 150–450)
POTASSIUM SERPL-SCNC: 3.6 MMOL/L (ref 3.5–5.1)
RBC # BLD AUTO: 2.81 MIL/UL (ref 4–5.2)
SODIUM SERPL-SCNC: 141 MMOL/L (ref 136–145)
WBC NRBC COR # BLD AUTO: 5.5 K/UL (ref 4.3–11)

## 2022-12-10 RX ADMIN — Medication SCH ML: at 17:02

## 2022-12-10 RX ADMIN — Medication SCH ML: at 12:09

## 2022-12-10 RX ADMIN — BENZTROPINE MESYLATE SCH MG: 1 TABLET ORAL at 21:18

## 2022-12-10 RX ADMIN — SODIUM CHLORIDE SCH MLS/HR: 9 INJECTION, SOLUTION INTRAVENOUS at 15:15

## 2022-12-10 RX ADMIN — DEXTROSE MONOHYDRATE SCH MLS/HR: 50 INJECTION, SOLUTION INTRAVENOUS at 05:35

## 2022-12-10 RX ADMIN — DEXTROSE MONOHYDRATE SCH MLS/HR: 50 INJECTION, SOLUTION INTRAVENOUS at 21:19

## 2022-12-10 RX ADMIN — DEXTROSE MONOHYDRATE SCH MLS/HR: 50 INJECTION, SOLUTION INTRAVENOUS at 05:32

## 2022-12-10 RX ADMIN — DEXTROSE MONOHYDRATE SCH MLS/HR: 50 INJECTION, SOLUTION INTRAVENOUS at 22:00

## 2022-12-10 RX ADMIN — DEXTROSE MONOHYDRATE SCH MLS/HR: 50 INJECTION, SOLUTION INTRAVENOUS at 12:19

## 2022-12-10 RX ADMIN — DEXTROSE MONOHYDRATE SCH MLS/HR: 50 INJECTION, SOLUTION INTRAVENOUS at 12:53

## 2022-12-10 RX ADMIN — BENZTROPINE MESYLATE SCH MG: 1 TABLET ORAL at 08:06

## 2022-12-10 RX ADMIN — RISPERIDONE SCH MG: 0.5 TABLET, ORALLY DISINTEGRATING ORAL at 08:07

## 2022-12-10 RX ADMIN — Medication SCH ML: at 08:09

## 2022-12-10 RX ADMIN — SODIUM HYPOCHLORITE SCH ML: 5 SOLUTION TOPICAL at 09:22

## 2022-12-10 RX ADMIN — RISPERIDONE SCH MG: 0.5 TABLET, ORALLY DISINTEGRATING ORAL at 21:18

## 2022-12-10 RX ADMIN — PANTOPRAZOLE SODIUM SCH MG: 40 TABLET, DELAYED RELEASE ORAL at 07:59

## 2022-12-10 NOTE — NUR
RN OPENING NOTES



RECEIVED PT IN BED, ASLEEP ON RIGHT SIDE, AWAKENS TO VERBAL STIMULI. AOx3, ABLE TO MAKE 
NEEDS KNOWN. ON RA AND TOLERATING WELL. NO SOB NOTED. NO S/SX OF RESPIRATORY DISTRESS NOTED. 
IV ACCESS IN SHAHRIAR MIDLINE #18G RUNNING NS @  75 ML/HR. SAFETY PRECAUTIONS IN PLACE: BED IN 
LOWEST, LOCKED POSITION, SIDERAILS UPx2, AND BRAKES ON. TABLE AND CALL LIGHT WITHIN REACH. 
ALL NEEDS MET AT THIS TIME.

## 2022-12-10 NOTE — NUR
RN NOTE



RECEIVED PATIENT IN BED RESTING ALERT ORIENTED X2 VERBALLY RESPONSIVE ON ROOM AIR O2:96% IV 
SITE IS ON RIGHT UPPER ARM MIDLINE INTACT PATENT,ON IV HYDRATION NS 75CC/HR.CONTIENT 
BOWEL/BLADDER,SAFETY MEASURE IMPLEMENT BED IN LOW POSITION AND LOCKED,CALL LIGHT WITHIN 
REACH CONTINUE TO MONITOR

## 2022-12-10 NOTE — NUR
RN NOTES





PATIENT REMAINS STABLE NO SIGNIFICANT CHANGES IN HEALTH STATUS. ALL DUE MEDS GIVEN AS 
ORDERED. PATIENT REFUSED WOUND CARE AND DRESSING. WILL ENDORSED TO MORNING SHIFT FOR RAUL

## 2022-12-10 NOTE — NUR
RN NOTE



PATIENT REMAINS ALERT ORIENTED X2 VERBALLY RESPONSIVE ON ROOM NO SOB NOT ACUTE DISTRESS 
NOTED,ALL DUE MEDS GIVEN AS MD ORDERED KEPT CLEAN AND DRY ALL THE TIME,ALL NEED MET WILL 
ENDORSE NEXT COMING SHIFT FOR CONTINUATION OF CARE,.

## 2022-12-11 VITALS — SYSTOLIC BLOOD PRESSURE: 101 MMHG | DIASTOLIC BLOOD PRESSURE: 50 MMHG

## 2022-12-11 VITALS — SYSTOLIC BLOOD PRESSURE: 95 MMHG | DIASTOLIC BLOOD PRESSURE: 57 MMHG

## 2022-12-11 VITALS — SYSTOLIC BLOOD PRESSURE: 102 MMHG | DIASTOLIC BLOOD PRESSURE: 59 MMHG

## 2022-12-11 LAB
BASOPHILS # BLD AUTO: 0 K/UL (ref 0–0.2)
BASOPHILS NFR BLD AUTO: 0.6 % (ref 0–2)
BUN SERPL-MCNC: 10 MG/DL (ref 7–18)
CALCIUM SERPL-MCNC: 7 MG/DL (ref 8.5–10.1)
CHLORIDE SERPL-SCNC: 105 MMOL/L (ref 98–107)
CO2 SERPL-SCNC: 31 MMOL/L (ref 21–32)
CREAT SERPL-MCNC: 0.6 MG/DL (ref 0.6–1.3)
EOSINOPHIL NFR BLD AUTO: 1.5 % (ref 0–6)
EOSINOPHIL NFR BLD MANUAL: 1 % (ref 0–4)
GLUCOSE SERPL-MCNC: 87 MG/DL (ref 74–106)
HCT VFR BLD AUTO: 25 % (ref 33–45)
HGB BLD-MCNC: 8.3 G/DL (ref 11.5–14.8)
LYMPHOCYTES NFR BLD AUTO: 1.2 K/UL (ref 0.8–4.8)
LYMPHOCYTES NFR BLD AUTO: 25 % (ref 20–44)
LYMPHOCYTES NFR BLD MANUAL: 18 % (ref 16–48)
MAGNESIUM SERPL-MCNC: 1.5 MG/DL (ref 1.8–2.4)
MCHC RBC AUTO-ENTMCNC: 33 G/DL (ref 31–36)
MCV RBC AUTO: 89 FL (ref 82–100)
MONOCYTES NFR BLD AUTO: 0.8 K/UL (ref 0.1–1.3)
MONOCYTES NFR BLD AUTO: 17.2 % (ref 2–12)
MONOCYTES NFR BLD MANUAL: 15 % (ref 0–11)
NEUTROPHILS # BLD AUTO: 2.6 K/UL (ref 1.8–8.9)
NEUTROPHILS NFR BLD AUTO: 55.7 % (ref 43–81)
NEUTS SEG NFR BLD MANUAL: 66 % (ref 42–76)
PHOSPHATE SERPL-MCNC: 2.7 MG/DL (ref 2.5–4.9)
PLATELET # BLD AUTO: 204 K/UL (ref 150–450)
POTASSIUM SERPL-SCNC: 3.3 MMOL/L (ref 3.5–5.1)
RBC # BLD AUTO: 2.78 MIL/UL (ref 4–5.2)
SODIUM SERPL-SCNC: 140 MMOL/L (ref 136–145)
WBC NRBC COR # BLD AUTO: 4.7 K/UL (ref 4.3–11)

## 2022-12-11 RX ADMIN — Medication SCH ML: at 16:08

## 2022-12-11 RX ADMIN — BENZTROPINE MESYLATE SCH MG: 1 TABLET ORAL at 21:10

## 2022-12-11 RX ADMIN — SODIUM CHLORIDE PRN MLS/HR: 9 INJECTION, SOLUTION INTRAVENOUS at 19:30

## 2022-12-11 RX ADMIN — DEXTROSE MONOHYDRATE SCH MLS/HR: 50 INJECTION, SOLUTION INTRAVENOUS at 04:18

## 2022-12-11 RX ADMIN — Medication SCH ML: at 12:10

## 2022-12-11 RX ADMIN — DEXTROSE MONOHYDRATE SCH MLS/HR: 50 INJECTION, SOLUTION INTRAVENOUS at 21:11

## 2022-12-11 RX ADMIN — SODIUM HYPOCHLORITE SCH ML: 5 SOLUTION TOPICAL at 08:14

## 2022-12-11 RX ADMIN — DEXTROSE MONOHYDRATE SCH MLS/HR: 50 INJECTION, SOLUTION INTRAVENOUS at 05:36

## 2022-12-11 RX ADMIN — DEXTROSE MONOHYDRATE SCH MLS/HR: 50 INJECTION, SOLUTION INTRAVENOUS at 14:18

## 2022-12-11 RX ADMIN — PANTOPRAZOLE SODIUM SCH MG: 40 TABLET, DELAYED RELEASE ORAL at 08:13

## 2022-12-11 RX ADMIN — DEXTROSE MONOHYDRATE SCH MLS/HR: 50 INJECTION, SOLUTION INTRAVENOUS at 12:42

## 2022-12-11 RX ADMIN — RISPERIDONE SCH MG: 0.5 TABLET, ORALLY DISINTEGRATING ORAL at 08:14

## 2022-12-11 RX ADMIN — SODIUM CHLORIDE PRN MLS/HR: 9 INJECTION, SOLUTION INTRAVENOUS at 04:18

## 2022-12-11 RX ADMIN — RISPERIDONE SCH MG: 0.5 TABLET, ORALLY DISINTEGRATING ORAL at 21:10

## 2022-12-11 RX ADMIN — Medication SCH ML: at 08:14

## 2022-12-11 RX ADMIN — BENZTROPINE MESYLATE SCH MG: 1 TABLET ORAL at 08:13

## 2022-12-11 NOTE — NUR
MS RN OPENING NOTE

PT RECEIVED IN BED, AWAKE, A&O X4, CALM, COOPERATIVE. PT ON RA WITH CURRENT O2SAT OF 98%; NO 
S/S OF RESP DISTRESS, NO SOB OR COUGH, NON-LABORED AND EQUAL BREATHING. VSS, WILL CONTINUE 
TO MONITOR THROUGHOUT THE NIGHT AS NEEDED. IV ACCESS ON RAC AND RIGHT HAND 22G, INTACT AND 
PATENT, FLUSHES EASILY WITH NO RESISTANCE; NS INFUSING AT 75 ML/HR. BED IN LOWEST POSITION, 
CALL LIGHT WITHIN REACH, SIDE RAILS UP X2. WILL CONTINUE TO MONITOR THROUGHOUT THE NIGHT.

## 2022-12-11 NOTE — NUR
RN CLOSING NOTES



PT IN BED, ASLEEP, AWAKENS TO VERBAL STIMULI. AOx3, ABLE TO MAKE NEEDS KNOWN. ON RA AND 
TOLERATING WELL. NO SOB NOTED. NO S/SX OF RESPIRATORY DISTRESS NOTED. SAFETY PRECAUTIONS IN 
PLACE: BED IN LOWEST, LOCKED POSITION, SIDERAILS UPx2, AND BRAKES ON. TABLE AND CALL LIGHT 
WITHIN REACH. WILL CONTINUE PLAN OF CARE. 

-------------------------------------------------------------------------------

Addendum: 12/11/22 at 1850 by KILLIAN COFFEY RN

-------------------------------------------------------------------------------

RN OPENING NOTES

## 2022-12-11 NOTE — NUR
RN CLOSING NOTES



PT IN BED, ASLEEP, AWAKENS TO VERBAL STIMULI. AOx3, ABLE TO MAKE NEEDS KNOWN. ON RA AND 
TOLERATING WELL. NO SOB NOTED. NO S/SX OF RESPIRATORY DISTRESS NOTED. IV ACCESS IN SHAHRIAR 
MIDLINE AND RIGHT WRIST NOTED PATENT AND INTACT, FLUSHES WELL. NOTED WITH NS @  75 ML/HR. PT 
KEPT CLEAN AND DRY. DRESSING ON RLE NOTED C/D/I. SAFETY PRECAUTIONS IN PLACE: BED IN LOWEST, 
LOCKED POSITION, SIDERAILS UPx2, AND BRAKES ON. TABLE AND CALL LIGHT WITHIN REACH. WILL 
ENDORSE TO ONCOMING SHIFT FOR RAUL.

## 2022-12-11 NOTE — NUR
RN CLOSING NOTES



PT IN BED, ASLEEP, AWAKENS TO VERBAL STIMULI. AOx3, ABLE TO MAKE NEEDS KNOWN. ON RA AND 
TOLERATING WELL. NO SOB NOTED. NO S/SX OF RESPIRATORY DISTRESS NOTED. IV ACCESS IN SHAHRIAR 
MIDLINE #18G RUNNING NS @  75 ML/HR. ALL ORDERS CARRIED OUT. ALL NEEDS MET. PT KEPT CLEAN 
AND DRY. SAFETY PRECAUTIONS IN PLACE: BED IN LOWEST, LOCKED POSITION, SIDERAILS UPx2, AND 
BRAKES ON. TABLE AND CALL LIGHT WITHIN REACH. WILL ENDORSE TO ONCOMING SHIFT FOR RAUL.

## 2022-12-12 VITALS — SYSTOLIC BLOOD PRESSURE: 99 MMHG | DIASTOLIC BLOOD PRESSURE: 55 MMHG

## 2022-12-12 VITALS — DIASTOLIC BLOOD PRESSURE: 58 MMHG | SYSTOLIC BLOOD PRESSURE: 105 MMHG

## 2022-12-12 VITALS — DIASTOLIC BLOOD PRESSURE: 55 MMHG | SYSTOLIC BLOOD PRESSURE: 118 MMHG

## 2022-12-12 VITALS — SYSTOLIC BLOOD PRESSURE: 100 MMHG | DIASTOLIC BLOOD PRESSURE: 57 MMHG

## 2022-12-12 LAB
BASOPHILS # BLD AUTO: 0 K/UL (ref 0–0.2)
BASOPHILS NFR BLD AUTO: 0.6 % (ref 0–2)
BUN SERPL-MCNC: 7 MG/DL (ref 7–18)
CALCIUM SERPL-MCNC: 7.6 MG/DL (ref 8.5–10.1)
CHLORIDE SERPL-SCNC: 106 MMOL/L (ref 98–107)
CO2 SERPL-SCNC: 33 MMOL/L (ref 21–32)
CREAT SERPL-MCNC: 0.6 MG/DL (ref 0.6–1.3)
EOSINOPHIL NFR BLD AUTO: 1.2 % (ref 0–6)
GLUCOSE SERPL-MCNC: 87 MG/DL (ref 74–106)
HCT VFR BLD AUTO: 27 % (ref 33–45)
HGB BLD-MCNC: 8.9 G/DL (ref 11.5–14.8)
LYMPHOCYTES NFR BLD AUTO: 1.3 K/UL (ref 0.8–4.8)
LYMPHOCYTES NFR BLD AUTO: 23.6 % (ref 20–44)
MAGNESIUM SERPL-MCNC: 1.6 MG/DL (ref 1.8–2.4)
MCHC RBC AUTO-ENTMCNC: 34 G/DL (ref 31–36)
MCV RBC AUTO: 90 FL (ref 82–100)
MONOCYTES NFR BLD AUTO: 0.7 K/UL (ref 0.1–1.3)
MONOCYTES NFR BLD AUTO: 13.7 % (ref 2–12)
NEUTROPHILS # BLD AUTO: 3.3 K/UL (ref 1.8–8.9)
NEUTROPHILS NFR BLD AUTO: 60.9 % (ref 43–81)
PHOSPHATE SERPL-MCNC: 2.9 MG/DL (ref 2.5–4.9)
PLATELET # BLD AUTO: 232 K/UL (ref 150–450)
POTASSIUM SERPL-SCNC: 3.8 MMOL/L (ref 3.5–5.1)
RBC # BLD AUTO: 2.95 MIL/UL (ref 4–5.2)
SODIUM SERPL-SCNC: 142 MMOL/L (ref 136–145)
WBC NRBC COR # BLD AUTO: 5.4 K/UL (ref 4.3–11)

## 2022-12-12 RX ADMIN — CEFEPIME HYDROCHLORIDE SCH MLS/HR: 1 INJECTION, POWDER, FOR SOLUTION INTRAMUSCULAR; INTRAVENOUS at 16:11

## 2022-12-12 RX ADMIN — DEXTROSE MONOHYDRATE SCH MLS/HR: 50 INJECTION, SOLUTION INTRAVENOUS at 05:07

## 2022-12-12 RX ADMIN — SODIUM CHLORIDE SCH MLS/HR: 9 INJECTION, SOLUTION INTRAVENOUS at 16:13

## 2022-12-12 RX ADMIN — PANTOPRAZOLE SODIUM SCH MG: 40 TABLET, DELAYED RELEASE ORAL at 08:00

## 2022-12-12 RX ADMIN — RISPERIDONE SCH MG: 0.5 TABLET, ORALLY DISINTEGRATING ORAL at 21:23

## 2022-12-12 RX ADMIN — SODIUM CHLORIDE PRN MLS/HR: 9 INJECTION, SOLUTION INTRAVENOUS at 16:38

## 2022-12-12 RX ADMIN — Medication SCH ML: at 08:31

## 2022-12-12 RX ADMIN — Medication PRN TAB: at 14:47

## 2022-12-12 RX ADMIN — BENZTROPINE MESYLATE SCH MG: 1 TABLET ORAL at 08:31

## 2022-12-12 RX ADMIN — Medication PRN TAB: at 08:41

## 2022-12-12 RX ADMIN — SODIUM HYPOCHLORITE SCH ML: 5 SOLUTION TOPICAL at 08:32

## 2022-12-12 RX ADMIN — RISPERIDONE SCH MG: 0.5 TABLET, ORALLY DISINTEGRATING ORAL at 08:31

## 2022-12-12 RX ADMIN — CEFEPIME HYDROCHLORIDE SCH MLS/HR: 1 INJECTION, POWDER, FOR SOLUTION INTRAMUSCULAR; INTRAVENOUS at 08:31

## 2022-12-12 RX ADMIN — Medication SCH ML: at 12:46

## 2022-12-12 RX ADMIN — Medication SCH ML: at 17:00

## 2022-12-12 RX ADMIN — BENZTROPINE MESYLATE SCH MG: 1 TABLET ORAL at 21:23

## 2022-12-12 NOTE — NUR
MS RN CLOSING NOTE

PT REMAINS IN BED, ASLEEP BUT EASILY AROUSABLE, A&O X4, CALM, COOPERATIVE. CONTINUES TO BE 
ON RA WITH O2SAT STABLE AT 98%; NO S/S OF RESP DISTRESS, NO SOB OR COUGH, NON-LABORED AND 
EQUAL BREATHING. VSS THROUGHOUT THE NIGHT WITH NO SIGNIFICANT CHANGES. IV ACCESS ON RAC AND 
RIGHT HAND 22G, INTACT AND PATENT, FLUSHES EASILY WITH NO RESISTANCE; NS INFUSING AT 75 
ML/HR. ALL DUE MEDS ADMINISTERED DURING THE NIGHT. PT REFUSED WOUND CARE. BED IN LOWEST 
POSITION, CALL LIGHT WITHIN REACH, SIDE RAILS UP X2. WILL ENDORSE TO DAYSHIFT NURSE TO 
CONTINUE CARE.

## 2022-12-12 NOTE — NUR
MS RN OPENING NOTE

PT RECEIVED IN BED, AWAKE, A&O X4. PT ON RA WITH CURRENT O2SAT OF 99%; NO S/S OF RESP 
DISTRESS, NO SOB OR COUGH, NON-LABORED AND EQUAL BREATHING. VSS, WILL CONTINUE TO MONITOR 
THROUGHOUT THE NIGHT AS NEEDED. IV ACCESS ON SHAHRIAR AND RIGHT HAND 22G, INTACT AND PATENT, 
FLUSHES EASILY WITH NO RESISTANCE; NS INFUSING AT 75 ML/HR. BED IN LOWEST POSITION, CALL 
LIGHT WITHIN REACH, SIDE RAILS UP X2. WILL CONTINUE TO MONITOR THROUGHOUT THE NIGHT.

## 2022-12-12 NOTE — NUR
RN note



Patient is resting in bed without active complaint.  Lower limbs are elevated. IV site is 
dry and intact with NS running at 75mL/hr. Call bell is placed within reach. Bed is locked 
and placed in the lowest position. All safety measures have been implemented. Will continue 
care and monitoring.

## 2022-12-12 NOTE — NUR
RN note



Received patient in bed without active complaint.  Noted 1+ pitting edema in lower limbs. IV 
site is dry and intact with IVF running at 25mL/hr. Call bell is placed within reach. Bed is 
locked and placed in the lowest position. All safety measures have been implemented. Will 
continue care and monitoring.

## 2022-12-12 NOTE — NUR
RN notes



NS dressing was done to right leg wound. Removed old dressing(xeroform and gauze packed in 
the upper calf cavity). Pungent smell was present during dressing with minimal bleeding. 
Observed slough-like tissue over the foot close to the medial malleolus. 



Wounds are covered by xeroform and abdominal pads, creped with gauze bandage and acrix. 
Hydrocodone has been administered 1 hour prior wound dressing. Pain was reported as 8-9/10 
during dressing. Will continue administering hydrocodone for pain control today. Offered 
morphine injection but patient refused.

-------------------------------------------------------------------------------

Addendum: 12/12/22 at 1320 by MANUEL VALADEZ RN

-------------------------------------------------------------------------------

clinical photos have been taken.

## 2022-12-13 VITALS — DIASTOLIC BLOOD PRESSURE: 55 MMHG | SYSTOLIC BLOOD PRESSURE: 95 MMHG

## 2022-12-13 VITALS — SYSTOLIC BLOOD PRESSURE: 102 MMHG | DIASTOLIC BLOOD PRESSURE: 59 MMHG

## 2022-12-13 VITALS — DIASTOLIC BLOOD PRESSURE: 50 MMHG | SYSTOLIC BLOOD PRESSURE: 91 MMHG

## 2022-12-13 LAB
BASOPHILS # BLD AUTO: 0 K/UL (ref 0–0.2)
BASOPHILS NFR BLD AUTO: 1 % (ref 0–2)
BUN SERPL-MCNC: 9 MG/DL (ref 7–18)
CALCIUM SERPL-MCNC: 7.7 MG/DL (ref 8.5–10.1)
CHLORIDE SERPL-SCNC: 106 MMOL/L (ref 98–107)
CO2 SERPL-SCNC: 32 MMOL/L (ref 21–32)
CREAT SERPL-MCNC: 0.6 MG/DL (ref 0.6–1.3)
EOSINOPHIL NFR BLD AUTO: 1.6 % (ref 0–6)
GLUCOSE SERPL-MCNC: 77 MG/DL (ref 74–106)
HCT VFR BLD AUTO: 27 % (ref 33–45)
HGB BLD-MCNC: 8.9 G/DL (ref 11.5–14.8)
LYMPHOCYTES NFR BLD AUTO: 1.4 K/UL (ref 0.8–4.8)
LYMPHOCYTES NFR BLD AUTO: 31.4 % (ref 20–44)
MCHC RBC AUTO-ENTMCNC: 33 G/DL (ref 31–36)
MCV RBC AUTO: 90 FL (ref 82–100)
MONOCYTES NFR BLD AUTO: 0.7 K/UL (ref 0.1–1.3)
MONOCYTES NFR BLD AUTO: 15 % (ref 2–12)
NEUTROPHILS # BLD AUTO: 2.3 K/UL (ref 1.8–8.9)
NEUTROPHILS NFR BLD AUTO: 51 % (ref 43–81)
PLATELET # BLD AUTO: 247 K/UL (ref 150–450)
POTASSIUM SERPL-SCNC: 3.6 MMOL/L (ref 3.5–5.1)
RBC # BLD AUTO: 2.96 MIL/UL (ref 4–5.2)
SODIUM SERPL-SCNC: 142 MMOL/L (ref 136–145)
WBC NRBC COR # BLD AUTO: 4.5 K/UL (ref 4.3–11)

## 2022-12-13 RX ADMIN — RISPERIDONE SCH MG: 0.5 TABLET, ORALLY DISINTEGRATING ORAL at 22:05

## 2022-12-13 RX ADMIN — Medication SCH ML: at 12:19

## 2022-12-13 RX ADMIN — Medication SCH ML: at 07:40

## 2022-12-13 RX ADMIN — Medication SCH ML: at 17:06

## 2022-12-13 RX ADMIN — CEFEPIME HYDROCHLORIDE SCH MLS/HR: 1 INJECTION, POWDER, FOR SOLUTION INTRAMUSCULAR; INTRAVENOUS at 07:48

## 2022-12-13 RX ADMIN — SODIUM HYPOCHLORITE SCH ML: 5 SOLUTION TOPICAL at 08:45

## 2022-12-13 RX ADMIN — CEFEPIME HYDROCHLORIDE SCH MLS/HR: 1 INJECTION, POWDER, FOR SOLUTION INTRAMUSCULAR; INTRAVENOUS at 23:40

## 2022-12-13 RX ADMIN — SODIUM CHLORIDE PRN MLS/HR: 9 INJECTION, SOLUTION INTRAVENOUS at 05:41

## 2022-12-13 RX ADMIN — BENZTROPINE MESYLATE SCH MG: 1 TABLET ORAL at 22:05

## 2022-12-13 RX ADMIN — MAGNESIUM SULFATE IN DEXTROSE SCH MLS/HR: 10 INJECTION, SOLUTION INTRAVENOUS at 14:59

## 2022-12-13 RX ADMIN — SODIUM CHLORIDE PRN MLS/HR: 9 INJECTION, SOLUTION INTRAVENOUS at 20:05

## 2022-12-13 RX ADMIN — CEFEPIME HYDROCHLORIDE SCH MLS/HR: 1 INJECTION, POWDER, FOR SOLUTION INTRAMUSCULAR; INTRAVENOUS at 15:38

## 2022-12-13 RX ADMIN — RISPERIDONE SCH MG: 0.5 TABLET, ORALLY DISINTEGRATING ORAL at 07:40

## 2022-12-13 RX ADMIN — BENZTROPINE MESYLATE SCH MG: 1 TABLET ORAL at 08:43

## 2022-12-13 RX ADMIN — CEFEPIME HYDROCHLORIDE SCH MLS/HR: 1 INJECTION, POWDER, FOR SOLUTION INTRAMUSCULAR; INTRAVENOUS at 00:40

## 2022-12-13 RX ADMIN — PANTOPRAZOLE SODIUM SCH MG: 40 TABLET, DELAYED RELEASE ORAL at 07:40

## 2022-12-13 RX ADMIN — MAGNESIUM SULFATE IN DEXTROSE SCH MLS/HR: 10 INJECTION, SOLUTION INTRAVENOUS at 13:22

## 2022-12-13 NOTE — NUR
MS RN CLOSING NOTE

PT REMAINS IN BED, ASLEEP BUT EASILY AROUSABLE, A&O X4, CALM, COOPERATIVE. CONTINUES TO BE 
ON RA WITH O2SAT STABLE AT 99%; NO S/S OF RESP DISTRESS, NO SOB OR COUGH, NON-LABORED AND 
EQUAL BREATHING. VSS THROUGHOUT THE NIGHT WITH NO SIGNIFICANT CHANGES. IV ACCESS ON SHAHRIAR AND 
RIGHT HAND 22G, INTACT AND PATENT, FLUSHES EASILY WITH NO RESISTANCE; NS INFUSING AT 75 
ML/HR. ALL DUE MEDS ADMINISTERED DURING THE NIGHT. PT REFUSED WOUND CARE AND BED BATH, BUT 
LINENS WERE CHANGED. BED IN LOWEST POSITION, CALL LIGHT WITHIN REACH, SIDE RAILS UP X2. WILL 
ENDORSE TO DAYSHIFT NURSE TO CONTINUE CARE.

## 2022-12-13 NOTE — NUR
INFORMED PATIENT THAT THE NURSE WILL CHANGE HER DRESSING BUT PATIENT REFUSED.  EXPLAINED THE 
IMPORTANCE OF DRESSING CHANGE BUT PATIENT DECLINED.

## 2022-12-13 NOTE — NUR
PATIENT IN BED AWAKE, A/OX4. NO RESPIRATORY DISTRESS NOTED ON RA. IV ACCESS ON RIGHT HAND 
#22 WITH NS INFUSING @ 75 ML/HR,  SHAHRIAR MIDLINE IN PLACE. SAFETY MEASURES IN PLACE. WILL 
CONTINUE PLAN OF CARE.

## 2022-12-13 NOTE — NUR
RN CLOSING NOTES:

PATIENT IN BED AWAKE AND WATCHING TELEVISION AT THIS TIME.  NO RESPIRATORY DISTRESS NOTED. 
ON RA WITH OXYGEN SATURATION OF 99%.  IV ACCESS ON RIGHT HAND WITH NS RUNNING @ 75 ML/HR, IV 
SITE WITH NO S/S INFILTRATION NOTED.  RIGHT UPPER ARM MIDLINE INTACT, PATENT AND FLUSHES 
WELL.  PATIENT HAS NO C/O PAIN OR DISCOMFORT NOTED THROUGHOUT SHIFT.  ALL NEEDS MET AND 
ANTICIPATED.  ALL SAFETY MEASURES IMPLEMENTED.  BED LOCKED AND IN LOWEST POSITION WITH BED 
ALARM ON.   WILL ENDORSE TO INCOMING NURSE FOR CONTINUITY OF CARE.

## 2022-12-13 NOTE — NUR
RN MED SURG OPENING NOTES:

RECEIVED PATIENT IN BED ASLEEP BUT EASILY AROUSES TO VOICE AND TACTILE STIMULI.  PATINE HAS 
NO SOB NOTED, BREATHING EVEN AND UNLABORED.  ON RA WITH OXYGEN SATURATION OF 99%.  IV ACCESS 
ON RIGHT HAND WITH NS RUNNING @ 75 ML/HR, IV SITE WITH NO S/S INFILTRATION NOTED.  RIGHT 
UPPER ARM MIDLINE INTACT, PATENT AND FLUSHES WELL.  NO C/O PAIN OR DISCOMFORT AT THIS TIME.  
ALL SAFETY MEASURES IN PLACE.  CALL LIGHT WITHIN REACH.  BED LOCKED AND IN LOWEST POSITION 
WITH BED ALARM ON.  WILL CONTINUE TO MONITOR PATIENT THROUGHOUT SHIFT.

## 2022-12-14 VITALS — SYSTOLIC BLOOD PRESSURE: 96 MMHG | DIASTOLIC BLOOD PRESSURE: 58 MMHG

## 2022-12-14 VITALS — SYSTOLIC BLOOD PRESSURE: 99 MMHG | DIASTOLIC BLOOD PRESSURE: 63 MMHG

## 2022-12-14 VITALS — DIASTOLIC BLOOD PRESSURE: 54 MMHG | SYSTOLIC BLOOD PRESSURE: 103 MMHG

## 2022-12-14 VITALS — SYSTOLIC BLOOD PRESSURE: 96 MMHG | DIASTOLIC BLOOD PRESSURE: 53 MMHG

## 2022-12-14 LAB
BASOPHILS # BLD AUTO: 0.1 K/UL (ref 0–0.2)
BASOPHILS NFR BLD AUTO: 1.5 % (ref 0–2)
BUN SERPL-MCNC: 11 MG/DL (ref 7–18)
CALCIUM SERPL-MCNC: 7.7 MG/DL (ref 8.5–10.1)
CHLORIDE SERPL-SCNC: 107 MMOL/L (ref 98–107)
CO2 SERPL-SCNC: 32 MMOL/L (ref 21–32)
CREAT SERPL-MCNC: 0.6 MG/DL (ref 0.6–1.3)
EOSINOPHIL NFR BLD AUTO: 1.2 % (ref 0–6)
GLUCOSE SERPL-MCNC: 80 MG/DL (ref 74–106)
HCT VFR BLD AUTO: 26 % (ref 33–45)
HGB BLD-MCNC: 8.6 G/DL (ref 11.5–14.8)
LYMPHOCYTES NFR BLD AUTO: 1.6 K/UL (ref 0.8–4.8)
LYMPHOCYTES NFR BLD AUTO: 28.7 % (ref 20–44)
MAGNESIUM SERPL-MCNC: 1.8 MG/DL (ref 1.8–2.4)
MCHC RBC AUTO-ENTMCNC: 33 G/DL (ref 31–36)
MCV RBC AUTO: 91 FL (ref 82–100)
MONOCYTES NFR BLD AUTO: 0.6 K/UL (ref 0.1–1.3)
MONOCYTES NFR BLD AUTO: 10 % (ref 2–12)
NEUTROPHILS # BLD AUTO: 3.3 K/UL (ref 1.8–8.9)
NEUTROPHILS NFR BLD AUTO: 58.6 % (ref 43–81)
PHOSPHATE SERPL-MCNC: 2.5 MG/DL (ref 2.5–4.9)
PLATELET # BLD AUTO: 267 K/UL (ref 150–450)
POTASSIUM SERPL-SCNC: 3.8 MMOL/L (ref 3.5–5.1)
RBC # BLD AUTO: 2.85 MIL/UL (ref 4–5.2)
SODIUM SERPL-SCNC: 141 MMOL/L (ref 136–145)
WBC NRBC COR # BLD AUTO: 5.6 K/UL (ref 4.3–11)

## 2022-12-14 RX ADMIN — CEFEPIME HYDROCHLORIDE SCH MLS/HR: 1 INJECTION, POWDER, FOR SOLUTION INTRAMUSCULAR; INTRAVENOUS at 23:54

## 2022-12-14 RX ADMIN — Medication SCH ML: at 17:16

## 2022-12-14 RX ADMIN — SODIUM HYPOCHLORITE SCH ML: 5 SOLUTION TOPICAL at 08:10

## 2022-12-14 RX ADMIN — BENZTROPINE MESYLATE SCH MG: 1 TABLET ORAL at 08:06

## 2022-12-14 RX ADMIN — Medication SCH ML: at 07:54

## 2022-12-14 RX ADMIN — RISPERIDONE SCH MG: 0.5 TABLET, ORALLY DISINTEGRATING ORAL at 07:52

## 2022-12-14 RX ADMIN — Medication SCH ML: at 12:18

## 2022-12-14 RX ADMIN — SODIUM CHLORIDE PRN MLS/HR: 9 INJECTION, SOLUTION INTRAVENOUS at 08:09

## 2022-12-14 RX ADMIN — CEFEPIME HYDROCHLORIDE SCH MLS/HR: 1 INJECTION, POWDER, FOR SOLUTION INTRAMUSCULAR; INTRAVENOUS at 16:03

## 2022-12-14 RX ADMIN — PANTOPRAZOLE SODIUM SCH MG: 40 TABLET, DELAYED RELEASE ORAL at 07:52

## 2022-12-14 RX ADMIN — CEFEPIME HYDROCHLORIDE SCH MLS/HR: 1 INJECTION, POWDER, FOR SOLUTION INTRAMUSCULAR; INTRAVENOUS at 07:52

## 2022-12-14 RX ADMIN — BENZTROPINE MESYLATE SCH MG: 1 TABLET ORAL at 21:18

## 2022-12-14 RX ADMIN — RISPERIDONE SCH MG: 0.5 TABLET, ORALLY DISINTEGRATING ORAL at 21:19

## 2022-12-14 NOTE — NUR
RN closing NOTE



PT IS ALERT AND ORIENTED X1-2. PT IN BED. NO SIGNS OF PAIN OR DISCOMFORT AT THIS TIME. PT 
HAS RIGHT UPPER ARM MIDLINE. IV INTACT, PATENT AND FLUSHING WELL. PT ON ROOM AIR TOLERATING 
WELL. PT HAS RIGHT LOWER EXTREMITY DRESSING.DRESSING CLEAN,DRY AND INTACT.ALL SAFETY 
MEASURES IN PLACE.CALL LIGHT WITHIN REACH.BED LOCKED AT LOWEST POSITION. SIDE RAILS UP X2. 
bed alarm on. no significant changes during shift. endorsed to night shift rn for contiuity 
of care.

## 2022-12-14 NOTE — NUR
PT ASLEEP IN BED. A/OX4. NO RESPIRATORY DISTRESS NOTED ON RA. IV ACCESS ON RIGHT HAND #22 
WITH NS INFUSING @ 75 ML/HR,  SHAHRIAR MIDLINE IN PLACE. DUE MEDS GIVEN AS ORDERED. NEEDS 
ATTENDED. SAFETY MEASURES MAINTAINED. WILL ENDORSE TO NEXT NURSE ON DUTY FOR CONTINUITY OF 
CARE.

## 2022-12-15 VITALS — DIASTOLIC BLOOD PRESSURE: 56 MMHG | SYSTOLIC BLOOD PRESSURE: 104 MMHG

## 2022-12-15 LAB
BASOPHILS # BLD AUTO: 0 K/UL (ref 0–0.2)
BASOPHILS NFR BLD AUTO: 0.4 % (ref 0–2)
BUN SERPL-MCNC: 13 MG/DL (ref 7–18)
CALCIUM SERPL-MCNC: 7.5 MG/DL (ref 8.5–10.1)
CHLORIDE SERPL-SCNC: 107 MMOL/L (ref 98–107)
CO2 SERPL-SCNC: 33 MMOL/L (ref 21–32)
CREAT SERPL-MCNC: 0.6 MG/DL (ref 0.6–1.3)
EOSINOPHIL NFR BLD AUTO: 1 % (ref 0–6)
GLUCOSE SERPL-MCNC: 73 MG/DL (ref 74–106)
HCT VFR BLD AUTO: 26 % (ref 33–45)
HGB BLD-MCNC: 8.8 G/DL (ref 11.5–14.8)
LYMPHOCYTES NFR BLD AUTO: 1.5 K/UL (ref 0.8–4.8)
LYMPHOCYTES NFR BLD AUTO: 20.4 % (ref 20–44)
MAGNESIUM SERPL-MCNC: 1.7 MG/DL (ref 1.8–2.4)
MCHC RBC AUTO-ENTMCNC: 33 G/DL (ref 31–36)
MCV RBC AUTO: 91 FL (ref 82–100)
MONOCYTES NFR BLD AUTO: 0.8 K/UL (ref 0.1–1.3)
MONOCYTES NFR BLD AUTO: 10.5 % (ref 2–12)
NEUTROPHILS # BLD AUTO: 4.9 K/UL (ref 1.8–8.9)
NEUTROPHILS NFR BLD AUTO: 67.7 % (ref 43–81)
PHOSPHATE SERPL-MCNC: 2.8 MG/DL (ref 2.5–4.9)
PLATELET # BLD AUTO: 282 K/UL (ref 150–450)
POTASSIUM SERPL-SCNC: 3.6 MMOL/L (ref 3.5–5.1)
RBC # BLD AUTO: 2.9 MIL/UL (ref 4–5.2)
SODIUM SERPL-SCNC: 141 MMOL/L (ref 136–145)
WBC NRBC COR # BLD AUTO: 7.2 K/UL (ref 4.3–11)

## 2022-12-15 RX ADMIN — MAGNESIUM SULFATE IN DEXTROSE SCH MLS/HR: 10 INJECTION, SOLUTION INTRAVENOUS at 12:08

## 2022-12-15 RX ADMIN — SODIUM HYPOCHLORITE SCH ML: 5 SOLUTION TOPICAL at 08:46

## 2022-12-15 RX ADMIN — RISPERIDONE SCH MG: 0.5 TABLET, ORALLY DISINTEGRATING ORAL at 21:18

## 2022-12-15 RX ADMIN — Medication SCH ML: at 08:18

## 2022-12-15 RX ADMIN — CEFEPIME HYDROCHLORIDE SCH MLS/HR: 1 INJECTION, POWDER, FOR SOLUTION INTRAMUSCULAR; INTRAVENOUS at 08:46

## 2022-12-15 RX ADMIN — PANTOPRAZOLE SODIUM SCH MG: 40 TABLET, DELAYED RELEASE ORAL at 06:44

## 2022-12-15 RX ADMIN — MAGNESIUM SULFATE IN DEXTROSE SCH MLS/HR: 10 INJECTION, SOLUTION INTRAVENOUS at 17:20

## 2022-12-15 RX ADMIN — Medication SCH ML: at 17:06

## 2022-12-15 RX ADMIN — BENZTROPINE MESYLATE SCH MG: 1 TABLET ORAL at 21:17

## 2022-12-15 RX ADMIN — BENZTROPINE MESYLATE SCH MG: 1 TABLET ORAL at 08:43

## 2022-12-15 RX ADMIN — CEFEPIME HYDROCHLORIDE SCH MLS/HR: 1 INJECTION, POWDER, FOR SOLUTION INTRAMUSCULAR; INTRAVENOUS at 16:31

## 2022-12-15 RX ADMIN — RISPERIDONE SCH MG: 0.5 TABLET, ORALLY DISINTEGRATING ORAL at 08:44

## 2022-12-15 RX ADMIN — Medication SCH ML: at 12:06

## 2022-12-16 VITALS — SYSTOLIC BLOOD PRESSURE: 86 MMHG | DIASTOLIC BLOOD PRESSURE: 53 MMHG

## 2022-12-16 RX ADMIN — RISPERIDONE SCH MG: 0.5 TABLET, ORALLY DISINTEGRATING ORAL at 08:10

## 2022-12-16 RX ADMIN — PANTOPRAZOLE SODIUM SCH MG: 40 TABLET, DELAYED RELEASE ORAL at 06:47

## 2022-12-16 RX ADMIN — BENZTROPINE MESYLATE SCH MG: 1 TABLET ORAL at 20:47

## 2022-12-16 RX ADMIN — Medication SCH ML: at 12:25

## 2022-12-16 RX ADMIN — SODIUM CHLORIDE PRN MLS/HR: 9 INJECTION, SOLUTION INTRAVENOUS at 11:19

## 2022-12-16 RX ADMIN — RISPERIDONE SCH MG: 0.5 TABLET, ORALLY DISINTEGRATING ORAL at 18:12

## 2022-12-16 RX ADMIN — CEFEPIME HYDROCHLORIDE SCH MLS/HR: 1 INJECTION, POWDER, FOR SOLUTION INTRAMUSCULAR; INTRAVENOUS at 08:09

## 2022-12-16 RX ADMIN — CEFEPIME HYDROCHLORIDE SCH MLS/HR: 1 INJECTION, POWDER, FOR SOLUTION INTRAMUSCULAR; INTRAVENOUS at 23:24

## 2022-12-16 RX ADMIN — CEFEPIME HYDROCHLORIDE SCH MLS/HR: 1 INJECTION, POWDER, FOR SOLUTION INTRAMUSCULAR; INTRAVENOUS at 17:32

## 2022-12-16 RX ADMIN — SODIUM HYPOCHLORITE SCH ML: 5 SOLUTION TOPICAL at 08:10

## 2022-12-16 RX ADMIN — Medication SCH ML: at 17:30

## 2022-12-16 RX ADMIN — CEFEPIME HYDROCHLORIDE SCH MLS/HR: 1 INJECTION, POWDER, FOR SOLUTION INTRAMUSCULAR; INTRAVENOUS at 00:02

## 2022-12-16 RX ADMIN — Medication SCH ML: at 08:10

## 2022-12-16 RX ADMIN — BENZTROPINE MESYLATE SCH MG: 1 TABLET ORAL at 08:10

## 2022-12-16 RX ADMIN — RISPERIDONE SCH MG: 0.5 TABLET, ORALLY DISINTEGRATING ORAL at 23:22

## 2022-12-16 NOTE — NUR
RN NOTES:

RECEIVED AWAKE ON BED, LYING COMFORTABLY, A/OX4, ORIENTED TO UNIT AND STAFF, COOPERATIVE TO 
CARE, RLE WOUND KEPT ELEVATED WHILE ON BED, NS AT 75 ML/HR TEMPORARILY STOP PER PATIENT 
REQUEST, ENCOURAGE HYDRATION, PROVIDED WITH WATER PITCHER AT BED SIDE,KEPT CALL LIGHT WITHIN 
EASY REACH, SAFETY AND ASPIRATION PRECAUTION OBSERVED.

## 2022-12-16 NOTE — NUR
RN NOTES:

-MEDICATION ORDER: RISPERDAL 3 MG DUE AT 2200, STOCK DOSE 0.5 MG=6 TABLETS, BY MISTAKE TOOK 
ONLY 1 TABLET=0.5 MG IN ZONE 4-DRAWER3- BIN-15, IMMEDIATELY NOTIFIED CHARGE NURSE, NEED TO 
TAKE 5 TABLETS=2.5 MG TO COMPLETE 3 MG DOSE.

-CN AND RN RECOUNT CUBICLE, UNFORTUNATELY RISPERDAL DID NOT OPEN

-CN OPEN THE OTHER CUBICLE, DISPENSE ONLY  4 TABLETS OF 0.5 MG  EQUIVALENT TO 2MG, STILL 
PENDING OF 0.5 MG TO COMPLETE THE DOSE, CN NOTIFIED RN SUPERVISOR

-RN SUPERVISOR CAME, SHE TOOK O.5MG=1 TABLET TO OTHER UNIT

-AT 2322 RISPERDAL 3 MG (0.5 MG=6 TABLETS) COMPLETED, GIVEN TO PATIENT.

## 2022-12-16 NOTE — NUR
RN OPENING NOTE



PT IS ALERT AND ORIENTED X1-2. PT IN BED. NO SIGNS OF PAIN OR DISCOMFORT AT THIS TIME. PT 
HAS RIGHT UPPER ARM MIDLINE. IV INTACT, PATENT AND FLUSHING WELL. PT ON ROOM AIR TOLERATING 
WELL. PT HAS RIGHT LOWER EXTREMITY DRESSING..ALL SAFETY MEASURES IN PLACE.CALL LIGHT WITHIN 
REACH.BED LOCKED AT LOWEST POSITION. SIDE RAILS UP X2.

## 2022-12-16 NOTE — NUR
RN NOTES:

ASSISTED TO; MELISSA, USING BED PAN, ASKED IF SHE HAS ANY PAIN OR DISCOMFORT, SHE SAID "I DONT 
HAVE ANY PAIN", AGREED TO RE CHECK BP-92/51, THEN SHE GO BACK TO SLEEP.

## 2022-12-16 NOTE — NUR
RN NOTES:

STABLE NO COMPLAINTS OF ANY PAIN OR DISCOMFORT, NO SIGN OF RESPIRATORY DISTRESS,AROUND 2000 
BP-86/53,  NO DIZZINESS, NO HEADACHE, EXPLAINED TO HER WE NEED TO RESUME HER  IVF OF NS AT 
75 ML/HR,SHE AGREED.

## 2022-12-17 LAB
BASOPHILS # BLD AUTO: 0 K/UL (ref 0–0.2)
BASOPHILS NFR BLD AUTO: 0.7 % (ref 0–2)
BUN SERPL-MCNC: 14 MG/DL (ref 7–18)
CALCIUM SERPL-MCNC: 7.6 MG/DL (ref 8.5–10.1)
CHLORIDE SERPL-SCNC: 105 MMOL/L (ref 98–107)
CO2 SERPL-SCNC: 31 MMOL/L (ref 21–32)
CREAT SERPL-MCNC: 0.7 MG/DL (ref 0.6–1.3)
EOSINOPHIL NFR BLD AUTO: 1.4 % (ref 0–6)
GLUCOSE SERPL-MCNC: 75 MG/DL (ref 74–106)
HCT VFR BLD AUTO: 26 % (ref 33–45)
HGB BLD-MCNC: 8.9 G/DL (ref 11.5–14.8)
LYMPHOCYTES NFR BLD AUTO: 1.7 K/UL (ref 0.8–4.8)
LYMPHOCYTES NFR BLD AUTO: 24.3 % (ref 20–44)
MAGNESIUM SERPL-MCNC: 1.8 MG/DL (ref 1.8–2.4)
MCHC RBC AUTO-ENTMCNC: 34 G/DL (ref 31–36)
MCV RBC AUTO: 91 FL (ref 82–100)
MONOCYTES NFR BLD AUTO: 1 K/UL (ref 0.1–1.3)
MONOCYTES NFR BLD AUTO: 14.2 % (ref 2–12)
NEUTROPHILS # BLD AUTO: 4.3 K/UL (ref 1.8–8.9)
NEUTROPHILS NFR BLD AUTO: 59.4 % (ref 43–81)
PHOSPHATE SERPL-MCNC: 3.1 MG/DL (ref 2.5–4.9)
PLATELET # BLD AUTO: 414 K/UL (ref 150–450)
POTASSIUM SERPL-SCNC: 3.6 MMOL/L (ref 3.5–5.1)
RBC # BLD AUTO: 2.87 MIL/UL (ref 4–5.2)
SODIUM SERPL-SCNC: 138 MMOL/L (ref 136–145)
WBC NRBC COR # BLD AUTO: 7.2 K/UL (ref 4.3–11)

## 2022-12-17 RX ADMIN — Medication SCH ML: at 17:00

## 2022-12-17 RX ADMIN — CEFEPIME HYDROCHLORIDE SCH MLS/HR: 1 INJECTION, POWDER, FOR SOLUTION INTRAMUSCULAR; INTRAVENOUS at 10:33

## 2022-12-17 RX ADMIN — Medication SCH ML: at 12:42

## 2022-12-17 RX ADMIN — RISPERIDONE SCH MG: 0.5 TABLET, ORALLY DISINTEGRATING ORAL at 10:31

## 2022-12-17 RX ADMIN — Medication SCH ML: at 08:00

## 2022-12-17 RX ADMIN — BENZTROPINE MESYLATE SCH MG: 1 TABLET ORAL at 21:19

## 2022-12-17 RX ADMIN — PANTOPRAZOLE SODIUM SCH MG: 40 TABLET, DELAYED RELEASE ORAL at 07:50

## 2022-12-17 RX ADMIN — RISPERIDONE SCH MG: 0.5 TABLET, ORALLY DISINTEGRATING ORAL at 21:19

## 2022-12-17 RX ADMIN — SODIUM HYPOCHLORITE SCH ML: 5 SOLUTION TOPICAL at 09:00

## 2022-12-17 RX ADMIN — BENZTROPINE MESYLATE SCH MG: 1 TABLET ORAL at 10:31

## 2022-12-17 RX ADMIN — RISPERIDONE SCH MG: 0.5 TABLET, ORALLY DISINTEGRATING ORAL at 16:39

## 2022-12-17 RX ADMIN — CEFEPIME HYDROCHLORIDE SCH MLS/HR: 1 INJECTION, POWDER, FOR SOLUTION INTRAMUSCULAR; INTRAVENOUS at 16:39

## 2022-12-17 NOTE — NUR
RN NOTES:

AROUND 0100 CHECK AT FREQUENT INTERVALS, AWAKE , ASSISTED TO USE BED PAN, THEN SHE GO BACK 
TO SLEEP.

## 2022-12-17 NOTE — NUR
RN NOTES





RECEIVED REPORT FROM REGISTRY RN. PATIENT IN BED A/O X2. ON ROOM AIR SATING 97% NO SOB NO 
DISTRESS NOTED AT THIS TIME. WITH IV ACCESS AT R HAND # 22 PATENT FLUSHES WELL RUNNING NS @ 
75 ML/HR. ALL SAFETY MEASURES IN PLACE AT ALL TIMES.ALL SAFETY MEASURES IN PLACE AT ALL 
TIMES. HOB ELEVATED. CALL LIGHT WITHIN REACH. WILL CLOSELY MONITOR THE PATIENT

## 2022-12-17 NOTE — NUR
RN NOTES:

RN CAME BACK AND OFFERED TO GIVE PAIN MEDS AND DO HER RLE WOUND DRESSING, SHE SAID "I AM NOT 
YET READY, I WANT TO SLEEP AND GATHER MY STRENGTH", RESPECT HER DECISION.IVF CONTINUE, 
REPOSITIONED, RLE ELEVATED, FOR LABS THIS MORNING, NO PAIN OR DISCOMFORT, ENDORSED FOR 
CONTINUITY OF CARE.

## 2022-12-17 NOTE — NUR
RN NOTES:

DURING V/S CHECKING RN OFFERED TO DO CHANGE OF WOUND DRESSING ON HER LEFT FOOT , SHE 
REFUSED, SHE SAID LATER.

## 2022-12-18 RX ADMIN — BENZTROPINE MESYLATE SCH MG: 1 TABLET ORAL at 09:19

## 2022-12-18 RX ADMIN — BENZTROPINE MESYLATE SCH MG: 1 TABLET ORAL at 21:01

## 2022-12-18 RX ADMIN — RISPERIDONE SCH MG: 0.5 TABLET, ORALLY DISINTEGRATING ORAL at 09:19

## 2022-12-18 RX ADMIN — SODIUM CHLORIDE PRN MLS/HR: 9 INJECTION, SOLUTION INTRAVENOUS at 00:45

## 2022-12-18 RX ADMIN — PANTOPRAZOLE SODIUM SCH MG: 40 TABLET, DELAYED RELEASE ORAL at 09:19

## 2022-12-18 RX ADMIN — Medication SCH ML: at 08:00

## 2022-12-18 RX ADMIN — CEFEPIME HYDROCHLORIDE SCH MLS/HR: 1 INJECTION, POWDER, FOR SOLUTION INTRAMUSCULAR; INTRAVENOUS at 09:18

## 2022-12-18 RX ADMIN — RISPERIDONE SCH MG: 0.5 TABLET, ORALLY DISINTEGRATING ORAL at 21:01

## 2022-12-18 RX ADMIN — Medication SCH ML: at 12:05

## 2022-12-18 RX ADMIN — CEFEPIME HYDROCHLORIDE SCH MLS/HR: 1 INJECTION, POWDER, FOR SOLUTION INTRAMUSCULAR; INTRAVENOUS at 00:42

## 2022-12-18 RX ADMIN — RISPERIDONE SCH MG: 0.5 TABLET, ORALLY DISINTEGRATING ORAL at 16:50

## 2022-12-18 RX ADMIN — Medication SCH ML: at 17:00

## 2022-12-18 RX ADMIN — SODIUM HYPOCHLORITE SCH ML: 5 SOLUTION TOPICAL at 09:00

## 2022-12-18 RX ADMIN — CEFEPIME HYDROCHLORIDE SCH MLS/HR: 1 INJECTION, POWDER, FOR SOLUTION INTRAMUSCULAR; INTRAVENOUS at 16:50

## 2022-12-18 NOTE — NUR
RN NOTES





PATIENT REFUSED FOR WOUND CARE AND DRESSING CHANGE. R/B EXPLAINED STILL REFUSED. WILL 
INFORMED DOCTOR IN A.M

## 2022-12-18 NOTE — NUR
PATIENT REFUSED WOUND CARE. WRITER MADE SEVERAL ATTEMPTS AND PATIENT STILL REFUSED. "IT 
HURTS WHEN I MOVE IT" OFFERED PAIN MEDS AND PATIENT DECLINED. PATIENT STATED, " MAYBE LATER 
BUT NOT RIGHT NOW." PATIENT WAS EDUCATED ON THE IMPORTANCE OF DRESSING CHANGE AS PER ORDER, 
VOICED UNDERSTANDING. CONTINUES TO REFUSED. ROYA CUNHA RN BSN.

## 2022-12-18 NOTE — NUR
RN NOTES





PATIENT REMAINS STABLE NO SIGNIFICANT CHANGES IN HEALTH CONDITION. ALL DUE MEDS GIVEN AS 
ORDERED. STILL REFUSING FOR DRESSING CHANGE. WILL ENDORSED TO MORNING SHIFT FOR RAUL

## 2022-12-19 LAB
BASOPHILS # BLD AUTO: 0 K/UL (ref 0–0.2)
BASOPHILS NFR BLD AUTO: 0.6 % (ref 0–2)
BUN SERPL-MCNC: 18 MG/DL (ref 7–18)
CALCIUM SERPL-MCNC: 7.9 MG/DL (ref 8.5–10.1)
CHLORIDE SERPL-SCNC: 104 MMOL/L (ref 98–107)
CO2 SERPL-SCNC: 31 MMOL/L (ref 21–32)
CREAT SERPL-MCNC: 0.6 MG/DL (ref 0.6–1.3)
EOSINOPHIL NFR BLD AUTO: 0.4 % (ref 0–6)
GLUCOSE SERPL-MCNC: 84 MG/DL (ref 74–106)
HCT VFR BLD AUTO: 26 % (ref 33–45)
HGB BLD-MCNC: 8.7 G/DL (ref 11.5–14.8)
LYMPHOCYTES NFR BLD AUTO: 1.3 K/UL (ref 0.8–4.8)
LYMPHOCYTES NFR BLD AUTO: 15.6 % (ref 20–44)
MAGNESIUM SERPL-MCNC: 1.8 MG/DL (ref 1.8–2.4)
MCHC RBC AUTO-ENTMCNC: 33 G/DL (ref 31–36)
MCV RBC AUTO: 92 FL (ref 82–100)
MONOCYTES NFR BLD AUTO: 1.2 K/UL (ref 0.1–1.3)
MONOCYTES NFR BLD AUTO: 14.8 % (ref 2–12)
NEUTROPHILS # BLD AUTO: 5.7 K/UL (ref 1.8–8.9)
NEUTROPHILS NFR BLD AUTO: 68.6 % (ref 43–81)
PHOSPHATE SERPL-MCNC: 3.4 MG/DL (ref 2.5–4.9)
PLATELET # BLD AUTO: 534 K/UL (ref 150–450)
POTASSIUM SERPL-SCNC: 3.9 MMOL/L (ref 3.5–5.1)
RBC # BLD AUTO: 2.88 MIL/UL (ref 4–5.2)
SODIUM SERPL-SCNC: 140 MMOL/L (ref 136–145)
WBC NRBC COR # BLD AUTO: 8.3 K/UL (ref 4.3–11)

## 2022-12-19 RX ADMIN — SODIUM HYPOCHLORITE SCH ML: 5 SOLUTION TOPICAL at 09:00

## 2022-12-19 RX ADMIN — RISPERIDONE SCH MG: 0.5 TABLET, ORALLY DISINTEGRATING ORAL at 09:04

## 2022-12-19 RX ADMIN — CEFEPIME HYDROCHLORIDE SCH MLS/HR: 1 INJECTION, POWDER, FOR SOLUTION INTRAMUSCULAR; INTRAVENOUS at 00:20

## 2022-12-19 RX ADMIN — Medication SCH ML: at 12:00

## 2022-12-19 RX ADMIN — BENZTROPINE MESYLATE SCH MG: 1 TABLET ORAL at 09:05

## 2022-12-19 RX ADMIN — CEFEPIME HYDROCHLORIDE SCH MLS/HR: 1 INJECTION, POWDER, FOR SOLUTION INTRAMUSCULAR; INTRAVENOUS at 09:05

## 2022-12-19 RX ADMIN — CEFEPIME HYDROCHLORIDE SCH MLS/HR: 1 INJECTION, POWDER, FOR SOLUTION INTRAMUSCULAR; INTRAVENOUS at 15:31

## 2022-12-19 RX ADMIN — Medication PRN TAB: at 09:05

## 2022-12-19 RX ADMIN — BENZTROPINE MESYLATE SCH MG: 1 TABLET ORAL at 22:05

## 2022-12-19 RX ADMIN — SODIUM CHLORIDE PRN MLS/HR: 9 INJECTION, SOLUTION INTRAVENOUS at 06:35

## 2022-12-19 RX ADMIN — RISPERIDONE SCH MG: 0.5 TABLET, ORALLY DISINTEGRATING ORAL at 17:16

## 2022-12-19 RX ADMIN — RISPERIDONE SCH MG: 0.5 TABLET, ORALLY DISINTEGRATING ORAL at 22:06

## 2022-12-19 RX ADMIN — Medication SCH ML: at 08:00

## 2022-12-19 RX ADMIN — PANTOPRAZOLE SODIUM SCH MG: 40 TABLET, DELAYED RELEASE ORAL at 07:30

## 2022-12-19 RX ADMIN — Medication SCH ML: at 17:16

## 2022-12-19 NOTE — NUR
WRITER ATTEMPTED TO CALL  REPORT @ [phone]. VOICEMAIL CAME ON. WRITER LEFT MESSAGE  ON 
VOICEMAIL TO RETURN CALL WITH NAME AND CALL BACK NUMBER. ROYA CUNHA RN 
-----------------------------------------------------------

## 2022-12-19 NOTE — NUR
DRESSING CHANGED COMPLETED TO RLE. WRITER MEDICATED PATIENT PRIOR TO DRESSING CHANGE WITH 
NARCO 5-325MG. DENIES PAIN DURING DRESSING CHANGE. DRESSING REMOVED SATURATED WITH YELLOW 
DRAINAGE, STRONG ODOR NOTED. CLEANSED WOUND WITH NS, PAT DRY AREA WITH DRY GAUZE. PACKED 
UNDERMINING AREA WITH NS MOISTEN GAUZE. APPLIED XEROFORM DIRECTLY TO WOUND COVERED WITH 
GAUZE, FOLLOWED BY ABD PADS, SECURED BY TAPE WRAPPED WITH ACE BAND. TOLERATED WELL. NO 
DISCOMFORT NOTED. ROAY CUNHA RN --------------------------------------------

## 2022-12-19 NOTE — NUR
RN CLOSING NOTE

ALL SIGNIFICANT CHANGES THROUGHOUT SHIFT WAS DOCUMENTED. PT STABLE WILL ENDORSE TO MORNING 
SHIFT FOR RAUL.

## 2022-12-19 NOTE — NUR
FACILITY CONTACT:

VIJAY spoke with Savannah lindquist ([phone]) who stated that pt is accepted at [facility] assisted [facility] located at [address] [place] [address]; ([phone]), Admin Madi 
who has accepted. VIJAY notified Malinda  to be in contact with Savannah ([phone]) 
who will provide transportation for pt when she is ready for discharge.

## 2022-12-19 NOTE — NUR
RN NOTE

PT REFUSED BED BATH AND WOUND CARE. OFFERED MEDICATION PRIOR FOR PAIN, STILL REFUSED. STATED 
SHE WOULD DO IT IN THE AFTERNOON.

## 2022-12-19 NOTE — NUR
RN OPENING NOTE



(THIS NURSE WAS FLOATED TO [facility] UNIT LAST MINUTE)



PATIENT ASLEEP IN BED. A/OX2 (NAME, PLACE). NO S/S OF DISTRESS, BREATHING WITHOUT DIFFICULTY 
ON ROOM AIR. SAFETY MEASURES IN PLACE: BED LOCKED AND AT LOWEST POSITION, RAILS UP X2, CALL 
BELL WITHIN REACH. 



ACCORDING TO VARIOUS NURSES ON THE FLOOR THAT GAVE ME REPORT, PATIENT WAS SUPPOSED TO BE 
D/C'd EARLIER IN THE DAY SHIFT AT 1800, BUT FACILITY NEVER SENT ANYONE. THIS RN CONTACTED 
RENÉE KELLY ([phone]) TO FOLLOW-UP. RENÉE STATED SHE WAS UNABLE TO SEND ANYONE AND 
WILL ATTEMPT TO PICK-UP PATIENT TOMORROW, 12/20/22, @1000 (ESTIMATED). PATIENT HAD IV ACCESS 
REMOVED. WILL ENCOURAGE PATIENT TO HAVE NEW IV ACCESS REINSERTED. 



WILL CONTINUE TO MONITOR PATIENT.

## 2022-12-19 NOTE — NUR
VIJAY Note:

SW met with patient briefly. Pt was unable to give appropriate address and discuss her 
discharge plan. Pt did report to this writer that she receives SSI $900.00 she stated that 
she receives to checks. VIJAY discussed Independent/Assisted Living option. VIJAY contacted Savannah Raman ([phone]) and discussed pt's case. Savannah stated that she will follow-up with 
[facility]isted [facility] and Independent Livings to see which placement is appropriate.

## 2022-12-20 RX ADMIN — SODIUM HYPOCHLORITE SCH ML: 5 SOLUTION TOPICAL at 08:19

## 2022-12-20 RX ADMIN — Medication SCH ML: at 08:19

## 2022-12-20 RX ADMIN — Medication PRN TAB: at 08:28

## 2022-12-20 RX ADMIN — RISPERIDONE SCH MG: 0.5 TABLET, ORALLY DISINTEGRATING ORAL at 08:27

## 2022-12-20 RX ADMIN — BENZTROPINE MESYLATE SCH MG: 1 TABLET ORAL at 08:27

## 2022-12-20 RX ADMIN — PANTOPRAZOLE SODIUM SCH MG: 40 TABLET, DELAYED RELEASE ORAL at 08:27

## 2022-12-20 RX ADMIN — CEFEPIME HYDROCHLORIDE SCH MLS/HR: 1 INJECTION, POWDER, FOR SOLUTION INTRAMUSCULAR; INTRAVENOUS at 00:32

## 2022-12-20 RX ADMIN — CEFEPIME HYDROCHLORIDE SCH MLS/HR: 1 INJECTION, POWDER, FOR SOLUTION INTRAMUSCULAR; INTRAVENOUS at 08:18

## 2022-12-20 NOTE — NUR
RN NOTE



PATIENT WAS DISCHARGED AT THIS TIME. ID BAND AND IV ACCESS REMOVED, NO BLEEDING NOTED. ALL 
VS STABLE. DISCHARGE INSTRUCTION GIVEN TO PATIENT, AND WAS ESCORTED VIA WHEELCHAIR WITH 
SECURITY GUARDS.

## 2022-12-20 NOTE — NUR
RN CLOSING NOTE



PATIENT ASLEEP IN BED. A/OX3. NO S/S OF DISTRESS, BREATHING WITHOUT DIFFICULTY ON ROOM AIR. 
LFA #20 INTACT AND PATENT W/ NS 75ML/HR. SAFETY MEASURES IN PLACE: BED LOCKED IN PLACE AND 
AT LOWEST POSITION, RAILS UP X2, CALL BELL WITHIN REACH. WILL ENDORSE TO NEXT SHIFT FOR RAUL.

## 2022-12-24 ENCOUNTER — HOSPITAL ENCOUNTER (EMERGENCY)
Dept: HOSPITAL 54 - ER | Age: 52
Discharge: LEFT BEFORE DISCHARGE | End: 2022-12-24
Payer: MEDICAID

## 2022-12-24 DIAGNOSIS — Z53.21: Primary | ICD-10-CM

## 2023-02-12 ENCOUNTER — HOSPITAL ENCOUNTER (INPATIENT)
Dept: HOSPITAL 12 - ER | Age: 53
LOS: 5 days | Discharge: HOME | DRG: 383 | End: 2023-02-17
Payer: MEDICAID

## 2023-02-12 VITALS — SYSTOLIC BLOOD PRESSURE: 103 MMHG | DIASTOLIC BLOOD PRESSURE: 57 MMHG

## 2023-02-12 VITALS — BODY MASS INDEX: 17.04 KG/M2 | HEIGHT: 66 IN | WEIGHT: 106 LBS

## 2023-02-12 DIAGNOSIS — L03.116: ICD-10-CM
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
## 2023-02-12 DIAGNOSIS — T68.XXXA: ICD-10-CM

## 2023-02-12 DIAGNOSIS — N39.0: ICD-10-CM

## 2023-02-12 DIAGNOSIS — S00.03XA: ICD-10-CM

## 2023-02-12 DIAGNOSIS — D64.9: ICD-10-CM

## 2023-02-12 DIAGNOSIS — Z91.199: ICD-10-CM

## 2023-02-12 DIAGNOSIS — Z20.822: ICD-10-CM
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
## 2023-02-12 DIAGNOSIS — S70.312A: ICD-10-CM

## 2023-02-12 DIAGNOSIS — F25.0: ICD-10-CM
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
## 2023-02-12 DIAGNOSIS — E87.6: ICD-10-CM

## 2023-02-12 DIAGNOSIS — L03.115: Primary | ICD-10-CM

## 2023-02-12 DIAGNOSIS — Z59.00: ICD-10-CM

## 2023-02-12 DIAGNOSIS — B96.89: ICD-10-CM

## 2023-02-12 DIAGNOSIS — E43: ICD-10-CM

## 2023-02-12 DIAGNOSIS — S70.311A: ICD-10-CM

## 2023-02-12 DIAGNOSIS — Z88.0: ICD-10-CM

## 2023-02-12 LAB
ALP SERPL-CCNC: 105 U/L (ref 50–136)
ALT SERPL W/O P-5'-P-CCNC: 21 U/L (ref 14–59)
APPEARANCE UR: (no result)
AST SERPL-CCNC: 20 U/L (ref 15–37)
BILIRUB DIRECT SERPL-MCNC: < 0.1 MG/DL (ref 0–0.2)
BILIRUB SERPL-MCNC: 0.3 MG/DL (ref 0.2–1)
BILIRUB UR QL STRIP: NEGATIVE
BUN SERPL-MCNC: 10 MG/DL (ref 7–18)
BUN SERPL-MCNC: 14 MG/DL (ref 7–18)
CHLORIDE SERPL-SCNC: 102 MMOL/L (ref 98–107)
CHLORIDE SERPL-SCNC: 106 MMOL/L (ref 98–107)
CK SERPL-CCNC: 93 U/L (ref 26–192)
CO2 SERPL-SCNC: 31 MMOL/L (ref 21–32)
CO2 SERPL-SCNC: 32 MMOL/L (ref 21–32)
COLOR UR: YELLOW
CREAT SERPL-MCNC: 0.4 MG/DL (ref 0.6–1.3)
CREAT SERPL-MCNC: 0.4 MG/DL (ref 0.6–1.3)
DEPRECATED SQUAMOUS URNS QL MICRO: (no result) /HPF
GLUCOSE SERPL-MCNC: 118 MG/DL (ref 74–106)
GLUCOSE SERPL-MCNC: 57 MG/DL (ref 74–106)
GLUCOSE UR STRIP-MCNC: NEGATIVE MG/DL
HCT VFR BLD AUTO: 31 % (ref 31.2–41.9)
HGB UR QL STRIP: NEGATIVE
KETONES UR STRIP-MCNC: NEGATIVE MG/DL
LEUKOCYTE ESTERASE UR QL STRIP: (no result)
LIPASE SERPL-CCNC: 89 U/L (ref 73–393)
MCH RBC QN AUTO: 29.8 UUG (ref 24.7–32.8)
MCV RBC AUTO: 90.8 FL (ref 75.5–95.3)
MUCOUS THREADS URNS QL MICRO: (no result) /LPF
NITRITE UR QL STRIP: NEGATIVE
PH UR STRIP: 7 [PH] (ref 5–8)
PLATELET # BLD AUTO: 386 K/UL (ref 179–408)
POTASSIUM SERPL-SCNC: 2.5 MMOL/L (ref 3.5–5.1)
POTASSIUM SERPL-SCNC: 2.9 MMOL/L (ref 3.5–5.1)
RBC #/AREA URNS HPF: (no result) /HPF (ref 0–3)
SP GR UR STRIP: 1.02 (ref 1–1.03)
UROBILINOGEN UR STRIP-MCNC: 0.2 E.U./DL
WBC #/AREA URNS HPF: (no result) /HPF
WBC #/AREA URNS HPF: (no result) /HPF (ref 0–3)
WS STN SPEC: 6.3 G/DL (ref 6.4–8.2)

## 2023-02-12 PROCEDURE — G0378 HOSPITAL OBSERVATION PER HR: HCPCS

## 2023-02-12 PROCEDURE — A4663 DIALYSIS BLOOD PRESSURE CUFF: HCPCS

## 2023-02-12 PROCEDURE — C9113 INJ PANTOPRAZOLE SODIUM, VIA: HCPCS

## 2023-02-12 PROCEDURE — A6213 FOAM DRG >16<=48 SQ IN W/BDR: HCPCS

## 2023-02-12 RX ADMIN — POTASSIUM CHLORIDE SCH MLS/HR: 14.9 INJECTION, SOLUTION INTRAVENOUS at 19:51

## 2023-02-12 RX ADMIN — SODIUM CHLORIDE, SODIUM LACTATE, POTASSIUM CHLORIDE, AND CALCIUM CHLORIDE PRN MLS/HR: .6; .31; .03; .02 INJECTION, SOLUTION INTRAVENOUS at 22:05

## 2023-02-12 RX ADMIN — POTASSIUM CHLORIDE SCH MLS/HR: 14.9 INJECTION, SOLUTION INTRAVENOUS at 11:15

## 2023-02-12 RX ADMIN — OXYCODONE HYDROCHLORIDE AND ACETAMINOPHEN SCH MG: 500 TABLET ORAL at 21:43

## 2023-02-12 SDOH — ECONOMIC STABILITY - HOUSING INSECURITY: HOMELESSNESS UNSPECIFIED: Z59.00

## 2023-02-12 NOTE — NUR
Arriola not needed, pt can use bed pan.  Also no thermometer on foleys in-stock, 
so MD says arriola is not necessary.

## 2023-02-12 NOTE — NUR
pt was covered in her own feces and had very old bandage on right lower leg 
which had melded with pt's pants.  Pt's clothes had to be cut off/thrown away 
and pt was cleaned.  Bandage removed exposing several old wounds with 
geenish-grey tint, other skin pale.  Additionally, open/raw epidermal surface 
wounds exposed pt's right medial thigh, left medial thigh, and left medial leg 
near knee.



ER MD and admitting MD aware.

## 2023-02-12 NOTE — NUR
Contacted Dr. Villegas and informed him that rectal temp is now 98.4.  Doctor 
ordered downgrade  "admit to tele" and administer additional 20meq of Potassium 
(KCl) IV.

## 2023-02-12 NOTE — NUR
PATIENT RECEIVED VIA GURNEY FROM ER.PATIENT ALERT ,AWAKE AND ABLE TO GIVEN INFORMATION . 
ADMITTED PATIENT AND DATA GIVEN BY PATIENT . PATIENT CAME WITH MULTIPLE WOUNDS PHOTO TAKE 
AND PLACED IN CHART WOUND CARE CONSULT FOR CELLULITIS AND  FOR HOMELESSNESS . 
ORIENTED PATIENT WITH ROOM AND CALL LIDIA ADVISED TO CALL FOR ASSISTANCE.

## 2023-02-13 VITALS — SYSTOLIC BLOOD PRESSURE: 101 MMHG | DIASTOLIC BLOOD PRESSURE: 51 MMHG

## 2023-02-13 VITALS — SYSTOLIC BLOOD PRESSURE: 94 MMHG | DIASTOLIC BLOOD PRESSURE: 48 MMHG

## 2023-02-13 VITALS — DIASTOLIC BLOOD PRESSURE: 48 MMHG | SYSTOLIC BLOOD PRESSURE: 92 MMHG

## 2023-02-13 VITALS — DIASTOLIC BLOOD PRESSURE: 52 MMHG | SYSTOLIC BLOOD PRESSURE: 100 MMHG

## 2023-02-13 VITALS — SYSTOLIC BLOOD PRESSURE: 100 MMHG | DIASTOLIC BLOOD PRESSURE: 52 MMHG

## 2023-02-13 LAB
ALP SERPL-CCNC: 81 U/L (ref 50–136)
ALT SERPL W/O P-5'-P-CCNC: 16 U/L (ref 14–59)
AST SERPL-CCNC: 15 U/L (ref 15–37)
BILIRUB DIRECT SERPL-MCNC: 0.1 MG/DL (ref 0–0.2)
BILIRUB SERPL-MCNC: 0.4 MG/DL (ref 0.2–1)
BUN SERPL-MCNC: 8 MG/DL (ref 7–18)
BUN SERPL-MCNC: 9 MG/DL (ref 7–18)
CHLORIDE SERPL-SCNC: 106 MMOL/L (ref 98–107)
CHLORIDE SERPL-SCNC: 107 MMOL/L (ref 98–107)
CHOLEST SERPL-MCNC: 107 MG/DL (ref ?–200)
CO2 SERPL-SCNC: 29 MMOL/L (ref 21–32)
CO2 SERPL-SCNC: 32 MMOL/L (ref 21–32)
CREAT SERPL-MCNC: 0.6 MG/DL (ref 0.6–1.3)
CREAT SERPL-MCNC: 0.8 MG/DL (ref 0.6–1.3)
GLUCOSE SERPL-MCNC: 68 MG/DL (ref 74–106)
GLUCOSE SERPL-MCNC: 88 MG/DL (ref 74–106)
HCT VFR BLD AUTO: 27.2 % (ref 31.2–41.9)
HDLC SERPL-MCNC: 64 MG/DL (ref 40–60)
IRON SERPL-MCNC: 23 UG/DL (ref 50–175)
MAGNESIUM SERPL-MCNC: 1.4 MG/DL (ref 1.8–2.4)
MAGNESIUM SERPL-MCNC: 1.9 MG/DL (ref 1.8–2.4)
MCH RBC QN AUTO: 29.7 UUG (ref 24.7–32.8)
MCV RBC AUTO: 90.2 FL (ref 75.5–95.3)
PHOSPHATE SERPL-MCNC: 2.5 MG/DL (ref 2.5–4.9)
PLATELET # BLD AUTO: 341 K/UL (ref 179–408)
POTASSIUM SERPL-SCNC: 3.4 MMOL/L (ref 3.5–5.1)
POTASSIUM SERPL-SCNC: 3.5 MMOL/L (ref 3.5–5.1)
TRIGL SERPL-MCNC: 49 MG/DL (ref 30–150)
WS STN SPEC: 4.9 G/DL (ref 6.4–8.2)

## 2023-02-13 RX ADMIN — SODIUM CHLORIDE, SODIUM LACTATE, POTASSIUM CHLORIDE, AND CALCIUM CHLORIDE PRN MLS/HR: .6; .31; .03; .02 INJECTION, SOLUTION INTRAVENOUS at 23:37

## 2023-02-13 RX ADMIN — MAGNESIUM SULFATE IN DEXTROSE SCH MLS/HR: 10 INJECTION, SOLUTION INTRAVENOUS at 12:40

## 2023-02-13 RX ADMIN — ACETAMINOPHEN PRN MG: 325 TABLET ORAL at 04:26

## 2023-02-13 RX ADMIN — MAGNESIUM SULFATE IN DEXTROSE SCH MLS/HR: 10 INJECTION, SOLUTION INTRAVENOUS at 17:21

## 2023-02-13 RX ADMIN — OXYCODONE HYDROCHLORIDE AND ACETAMINOPHEN SCH MG: 500 TABLET ORAL at 20:30

## 2023-02-13 RX ADMIN — DEXTROSE SCH MLS/HR: 50 INJECTION, SOLUTION INTRAVENOUS at 20:30

## 2023-02-13 RX ADMIN — OXYCODONE HYDROCHLORIDE AND ACETAMINOPHEN SCH MG: 500 TABLET ORAL at 08:18

## 2023-02-13 RX ADMIN — POTASSIUM CHLORIDE SCH MLS/HR: 14.9 INJECTION, SOLUTION INTRAVENOUS at 00:38

## 2023-02-13 RX ADMIN — MAGNESIUM SULFATE IN DEXTROSE SCH MLS/HR: 10 INJECTION, SOLUTION INTRAVENOUS at 15:27

## 2023-02-13 RX ADMIN — MAGNESIUM SULFATE IN DEXTROSE SCH MLS/HR: 10 INJECTION, SOLUTION INTRAVENOUS at 16:33

## 2023-02-13 RX ADMIN — ENOXAPARIN SODIUM SCH MG: 40 INJECTION SUBCUTANEOUS at 08:18

## 2023-02-13 RX ADMIN — DEXTROSE SCH MLS/HR: 50 INJECTION, SOLUTION INTRAVENOUS at 17:15

## 2023-02-13 RX ADMIN — DEXTROSE SCH MLS/HR: 50 INJECTION, SOLUTION INTRAVENOUS at 11:16

## 2023-02-13 RX ADMIN — Medication SCH MG: at 16:49

## 2023-02-13 RX ADMIN — DEXTROSE SCH MLS/HR: 50 INJECTION, SOLUTION INTRAVENOUS at 08:32

## 2023-02-13 RX ADMIN — DEXTROSE SCH MLS/HR: 50 INJECTION, SOLUTION INTRAVENOUS at 23:35

## 2023-02-13 RX ADMIN — ZINC SULFATE CAP 220 MG (50 MG ELEMENTAL ZN) SCH MG: 220 (50 ZN) CAP at 08:18

## 2023-02-13 NOTE — NUR
WOUND CARE CONSULT: PT PRESENTS WITH MULTIPLE WOUNDS/SKIN ISSUES INCLUDING FOREHEAD 
ABRASION, BILATERAL THIGH WOUNDS, RT HEEL AND RT LOWER LEG PURULENT WOUNDS, PRESENT ON 
ADMISSION. SACRAL SCAR NOTED AND SOME AREAS OF DISCOLORATION TO UPPER/MIDBACK, PRESENT ON 
ADMISSION. DR MASON  AND DR ADAMS CALLED FOR SURGICAL AND DPM CONSULTS. DISCUSSED 
SKIN PROTECTION WITH NURSING STAFF. PT IS INCONTINENT. MD IN AGREEMENT WITH PLAN OF CARE.

## 2023-02-13 NOTE — NUR
Pt refused a purewick and said she better has a diaper. Reinforced pt education regarding 
diaper vs purewick and possible skin integrity issues to the patient, she verbalized 
understanding. 

Did patient's wound care as wound nurse Joanna advised.

## 2023-02-13 NOTE — NUR
Pt is alert and oriented x 3, had her dinner, no complains of pain, sleeping at the moment. 
Dressing on her lower extremities are intact, Iv saline lock is intact.

## 2023-02-13 NOTE — NUR
Social Work consult was requested for a patient for homeless resources. Patient is a 
52-year-old female admitted to the hospital for sepsis and cellulitis. Patient is alert and 
oriented X3. Patient presents with depressed mood and congruent affect. Patient appears 
confused and lethargic. Patient states she does not have a primary contact. Patient states 
she currently lives on her aunts property on [address], and ROS provided the 
patient with homeless resources for [facility] [address] [place] [address] ([phone]) and [facility] [address] 
[place] [address] ([phone]). ROS gave resources for [facility] [address] [address]. Patient refused to sign the homeless waiver, 
and a copy was placed in the chart. 



Patient denies a history of substance abuse, and there is no toxicology report. Patient 
denies a history of psychiatric diagnosis and patient denies suicidal or homicidal ideation. 
ROS provided the patient with the mental health resource for [facility] [address] [place] [address] ([phone]).  Patient states she is open to 
going to a skilled nursing facility at discharge and SW informed , Boone, 
charge nurse Massiel, and Dr. Villegas.

## 2023-02-14 VITALS — SYSTOLIC BLOOD PRESSURE: 91 MMHG | DIASTOLIC BLOOD PRESSURE: 47 MMHG

## 2023-02-14 VITALS — DIASTOLIC BLOOD PRESSURE: 48 MMHG | SYSTOLIC BLOOD PRESSURE: 93 MMHG

## 2023-02-14 VITALS — SYSTOLIC BLOOD PRESSURE: 111 MMHG | DIASTOLIC BLOOD PRESSURE: 67 MMHG

## 2023-02-14 VITALS — SYSTOLIC BLOOD PRESSURE: 114 MMHG | DIASTOLIC BLOOD PRESSURE: 52 MMHG

## 2023-02-14 VITALS — SYSTOLIC BLOOD PRESSURE: 92 MMHG | DIASTOLIC BLOOD PRESSURE: 50 MMHG

## 2023-02-14 LAB
BUN SERPL-MCNC: 10 MG/DL (ref 7–18)
BUN SERPL-MCNC: 7 MG/DL (ref 7–18)
CHLORIDE SERPL-SCNC: 107 MMOL/L (ref 98–107)
CHLORIDE SERPL-SCNC: 108 MMOL/L (ref 98–107)
CO2 SERPL-SCNC: 31 MMOL/L (ref 21–32)
CO2 SERPL-SCNC: 31 MMOL/L (ref 21–32)
CREAT SERPL-MCNC: 0.5 MG/DL (ref 0.6–1.3)
CREAT SERPL-MCNC: 0.6 MG/DL (ref 0.6–1.3)
GLUCOSE SERPL-MCNC: 122 MG/DL (ref 74–106)
GLUCOSE SERPL-MCNC: 74 MG/DL (ref 74–106)
HCT VFR BLD AUTO: 25.7 % (ref 31.2–41.9)
MAGNESIUM SERPL-MCNC: 2 MG/DL (ref 1.8–2.4)
MCH RBC QN AUTO: 29.4 UUG (ref 24.7–32.8)
MCV RBC AUTO: 90.3 FL (ref 75.5–95.3)
PHOSPHATE SERPL-MCNC: 3.1 MG/DL (ref 2.5–4.9)
PLATELET # BLD AUTO: 282 K/UL (ref 179–408)
POTASSIUM SERPL-SCNC: 3.3 MMOL/L (ref 3.5–5.1)
POTASSIUM SERPL-SCNC: 3.6 MMOL/L (ref 3.5–5.1)

## 2023-02-14 RX ADMIN — ENOXAPARIN SODIUM SCH MG: 40 INJECTION SUBCUTANEOUS at 11:30

## 2023-02-14 RX ADMIN — DEXTROSE SCH MLS/HR: 50 INJECTION, SOLUTION INTRAVENOUS at 21:06

## 2023-02-14 RX ADMIN — OXYCODONE HYDROCHLORIDE AND ACETAMINOPHEN SCH MG: 500 TABLET ORAL at 10:02

## 2023-02-14 RX ADMIN — Medication SCH ML: at 15:00

## 2023-02-14 RX ADMIN — DEXTROSE SCH MLS/HR: 50 INJECTION, SOLUTION INTRAVENOUS at 11:13

## 2023-02-14 RX ADMIN — ZINC SULFATE CAP 220 MG (50 MG ELEMENTAL ZN) SCH MG: 220 (50 ZN) CAP at 10:01

## 2023-02-14 RX ADMIN — Medication SCH MG: at 16:22

## 2023-02-14 RX ADMIN — DEXTROSE SCH MLS/HR: 50 INJECTION, SOLUTION INTRAVENOUS at 15:31

## 2023-02-14 RX ADMIN — SODIUM CHLORIDE, SODIUM LACTATE, POTASSIUM CHLORIDE, AND CALCIUM CHLORIDE PRN MLS/HR: .6; .31; .03; .02 INJECTION, SOLUTION INTRAVENOUS at 11:18

## 2023-02-14 RX ADMIN — DEXTROSE SCH MLS/HR: 50 INJECTION, SOLUTION INTRAVENOUS at 02:59

## 2023-02-14 RX ADMIN — OXYCODONE HYDROCHLORIDE AND ACETAMINOPHEN SCH MG: 500 TABLET ORAL at 20:05

## 2023-02-14 RX ADMIN — Medication SCH MG: at 10:02

## 2023-02-14 RX ADMIN — SODIUM CHLORIDE SCH MLS/HR: 9 INJECTION, SOLUTION INTRAVENOUS at 13:53

## 2023-02-14 RX ADMIN — PANTOPRAZOLE SODIUM SCH MG: 40 TABLET, DELAYED RELEASE ORAL at 10:01

## 2023-02-14 NOTE — NUR
Magnesium level is 2.1 Dr Villegas made aware magnesium order canceled. Doctor also aware of 
potassium level of 3.3.

## 2023-02-14 NOTE — NUR
Received patient lying in bed. Asleep at this time. Appears comfortable. In no apparent 
distress. IV site on right AC intact and patent. IVF infusing. RLE with dressing intact, dry 
and clean. NSR on tele with HR of 84/min. Safety measure initiated and call light within 
reached.

## 2023-02-14 NOTE — NUR
Report received at 1935 form ongoing nurse at patient bedside. Patient was seen seeping 
profoundly, but woke up later during shift took her medications and for ADL. Medications 
well tolerated, no adverse effects reported. She denied pain and any discomfort; she slept 
thorough the night without any complications. Will continue to monitor patient for safety.

## 2023-02-15 VITALS — SYSTOLIC BLOOD PRESSURE: 94 MMHG | DIASTOLIC BLOOD PRESSURE: 46 MMHG

## 2023-02-15 VITALS — SYSTOLIC BLOOD PRESSURE: 99 MMHG | DIASTOLIC BLOOD PRESSURE: 51 MMHG

## 2023-02-15 VITALS — DIASTOLIC BLOOD PRESSURE: 53 MMHG | SYSTOLIC BLOOD PRESSURE: 93 MMHG

## 2023-02-15 VITALS — DIASTOLIC BLOOD PRESSURE: 45 MMHG | SYSTOLIC BLOOD PRESSURE: 89 MMHG

## 2023-02-15 VITALS — SYSTOLIC BLOOD PRESSURE: 90 MMHG | DIASTOLIC BLOOD PRESSURE: 46 MMHG

## 2023-02-15 VITALS — DIASTOLIC BLOOD PRESSURE: 47 MMHG | SYSTOLIC BLOOD PRESSURE: 95 MMHG

## 2023-02-15 LAB
AMPHETAMINES UR QL SCN>1000 NG/ML: NEGATIVE
BUN SERPL-MCNC: 10 MG/DL (ref 7–18)
BUN SERPL-MCNC: 8 MG/DL (ref 7–18)
CHLORIDE SERPL-SCNC: 106 MMOL/L (ref 98–107)
CHLORIDE SERPL-SCNC: 106 MMOL/L (ref 98–107)
CO2 SERPL-SCNC: 29 MMOL/L (ref 21–32)
CO2 SERPL-SCNC: 32 MMOL/L (ref 21–32)
COCAINE UR QL SCN: NEGATIVE
CREAT SERPL-MCNC: 0.4 MG/DL (ref 0.6–1.3)
CREAT SERPL-MCNC: 0.6 MG/DL (ref 0.6–1.3)
GLUCOSE SERPL-MCNC: 126 MG/DL (ref 74–106)
GLUCOSE SERPL-MCNC: 57 MG/DL (ref 74–106)
HCT VFR BLD AUTO: 28.6 % (ref 31.2–41.9)
MAGNESIUM SERPL-MCNC: 1.6 MG/DL (ref 1.8–2.4)
MAGNESIUM SERPL-MCNC: 1.9 MG/DL (ref 1.8–2.4)
MCH RBC QN AUTO: 29.5 UUG (ref 24.7–32.8)
MCV RBC AUTO: 90.4 FL (ref 75.5–95.3)
PCP UR QL SCN>25 NG/ML: NEGATIVE
PHOSPHATE SERPL-MCNC: 3.2 MG/DL (ref 2.5–4.9)
PLATELET # BLD AUTO: 274 K/UL (ref 179–408)
POTASSIUM SERPL-SCNC: 3.6 MMOL/L (ref 3.5–5.1)
POTASSIUM SERPL-SCNC: 3.9 MMOL/L (ref 3.5–5.1)
THC UR QL SCN>50 NG/ML: NEGATIVE

## 2023-02-15 RX ADMIN — DEXTROSE SCH MLS/HR: 50 INJECTION, SOLUTION INTRAVENOUS at 08:24

## 2023-02-15 RX ADMIN — OXYCODONE HYDROCHLORIDE AND ACETAMINOPHEN SCH MG: 500 TABLET ORAL at 08:24

## 2023-02-15 RX ADMIN — ACETAMINOPHEN PRN MG: 325 TABLET ORAL at 00:43

## 2023-02-15 RX ADMIN — PANTOPRAZOLE SODIUM SCH MG: 40 TABLET, DELAYED RELEASE ORAL at 08:25

## 2023-02-15 RX ADMIN — Medication SCH ML: at 08:27

## 2023-02-15 RX ADMIN — ENOXAPARIN SODIUM SCH MG: 40 INJECTION SUBCUTANEOUS at 08:27

## 2023-02-15 RX ADMIN — OXYCODONE HYDROCHLORIDE AND ACETAMINOPHEN SCH MG: 500 TABLET ORAL at 20:21

## 2023-02-15 RX ADMIN — SODIUM CHLORIDE SCH MLS/HR: 9 INJECTION, SOLUTION INTRAVENOUS at 14:03

## 2023-02-15 RX ADMIN — DEXTROSE SCH MLS/HR: 50 INJECTION, SOLUTION INTRAVENOUS at 00:07

## 2023-02-15 RX ADMIN — DEXTROSE SCH MLS/HR: 50 INJECTION, SOLUTION INTRAVENOUS at 15:44

## 2023-02-15 RX ADMIN — DEXTROSE SCH MLS/HR: 50 INJECTION, SOLUTION INTRAVENOUS at 23:11

## 2023-02-15 RX ADMIN — MAGNESIUM SULFATE IN DEXTROSE SCH MLS/HR: 10 INJECTION, SOLUTION INTRAVENOUS at 20:56

## 2023-02-15 RX ADMIN — ZINC SULFATE CAP 220 MG (50 MG ELEMENTAL ZN) SCH MG: 220 (50 ZN) CAP at 08:25

## 2023-02-15 RX ADMIN — MAGNESIUM SULFATE IN DEXTROSE SCH MLS/HR: 10 INJECTION, SOLUTION INTRAVENOUS at 22:10

## 2023-02-15 RX ADMIN — Medication SCH MG: at 17:28

## 2023-02-15 RX ADMIN — DEXTROSE SCH MLS/HR: 50 INJECTION, SOLUTION INTRAVENOUS at 09:49

## 2023-02-15 RX ADMIN — Medication SCH MG: at 08:25

## 2023-02-15 RX ADMIN — SODIUM CHLORIDE, SODIUM LACTATE, POTASSIUM CHLORIDE, AND CALCIUM CHLORIDE PRN MLS/HR: .6; .31; .03; .02 INJECTION, SOLUTION INTRAVENOUS at 04:44

## 2023-02-15 NOTE — NUR
Patient slept through out the night. In no acute distress. Jerzy any pain or SOB. No adverse 
reaction noted from IV antibiotics. NSR on tele with Hr of 65/min.Dressing to both LE 
intact. Needs attended to and met. Safety measure maintained and call light within reached.

## 2023-02-15 NOTE — NUR
Received patient lying in bed. Sleeping, appears comfortable. In no acute distress. IV site 
on right hand intact and patent. IVF infusing. BLE dressing intact, dry and clean. NSR on 
tele with HR of 97/min. Safety measure initiated and call light within reached.

## 2023-02-15 NOTE — NUR
wound treatment on both lower extremities done as ordered Helical Rim Advancement Flap Text: The defect edges were debeveled with a #15 blade scalpel.  Given the location of the defect and the proximity to free margins (helical rim) a double helical rim advancement flap was deemed most appropriate.  Using a sterile surgical marker, the appropriate advancement flaps were drawn incorporating the defect and placing the expected incisions between the helical rim and antihelix where possible.  The area thus outlined was incised through and through with a #15 scalpel blade.  With a skin hook and iris scissors, the flaps were gently and sharply undermined and freed up.

## 2023-02-15 NOTE — NUR
Patient refused to have dressing remove to have picture taken on her wounds to right LE and 
raul. inner thigh/groin area.

## 2023-02-16 VITALS — DIASTOLIC BLOOD PRESSURE: 49 MMHG | SYSTOLIC BLOOD PRESSURE: 95 MMHG

## 2023-02-16 VITALS — DIASTOLIC BLOOD PRESSURE: 45 MMHG | SYSTOLIC BLOOD PRESSURE: 108 MMHG

## 2023-02-16 VITALS — DIASTOLIC BLOOD PRESSURE: 48 MMHG | SYSTOLIC BLOOD PRESSURE: 94 MMHG

## 2023-02-16 VITALS — DIASTOLIC BLOOD PRESSURE: 54 MMHG | SYSTOLIC BLOOD PRESSURE: 101 MMHG

## 2023-02-16 VITALS — SYSTOLIC BLOOD PRESSURE: 93 MMHG | DIASTOLIC BLOOD PRESSURE: 54 MMHG

## 2023-02-16 LAB
BUN SERPL-MCNC: 9 MG/DL (ref 7–18)
CHLORIDE SERPL-SCNC: 106 MMOL/L (ref 98–107)
CO2 SERPL-SCNC: 33 MMOL/L (ref 21–32)
CREAT SERPL-MCNC: 0.4 MG/DL (ref 0.6–1.3)
GLUCOSE SERPL-MCNC: 76 MG/DL (ref 74–106)
HCT VFR BLD AUTO: 27.8 % (ref 31.2–41.9)
MAGNESIUM SERPL-MCNC: 2 MG/DL (ref 1.8–2.4)
MCH RBC QN AUTO: 29.6 UUG (ref 24.7–32.8)
MCV RBC AUTO: 91.1 FL (ref 75.5–95.3)
PHOSPHATE SERPL-MCNC: 2.8 MG/DL (ref 2.5–4.9)
PLATELET # BLD AUTO: 273 K/UL (ref 179–408)
POTASSIUM SERPL-SCNC: 3.5 MMOL/L (ref 3.5–5.1)
VANCOMYCIN TROUGH SERPL-MCNC: 11.9 UG/ML (ref 12–20)

## 2023-02-16 RX ADMIN — ANORECTAL OINTMENT PRN GM: 15.7; .44; 24; 20.6 OINTMENT TOPICAL at 09:14

## 2023-02-16 RX ADMIN — DEXTROSE SCH MLS/HR: 50 INJECTION, SOLUTION INTRAVENOUS at 09:05

## 2023-02-16 RX ADMIN — ZINC SULFATE CAP 220 MG (50 MG ELEMENTAL ZN) SCH MG: 220 (50 ZN) CAP at 11:11

## 2023-02-16 RX ADMIN — SODIUM CHLORIDE, SODIUM LACTATE, POTASSIUM CHLORIDE, AND CALCIUM CHLORIDE PRN MLS/HR: .6; .31; .03; .02 INJECTION, SOLUTION INTRAVENOUS at 21:04

## 2023-02-16 RX ADMIN — DEXTROSE SCH MLS/HR: 50 INJECTION, SOLUTION INTRAVENOUS at 00:05

## 2023-02-16 RX ADMIN — DEXTROSE SCH MLS/HR: 50 INJECTION, SOLUTION INTRAVENOUS at 17:04

## 2023-02-16 RX ADMIN — Medication SCH MG: at 17:07

## 2023-02-16 RX ADMIN — OXYCODONE HYDROCHLORIDE AND ACETAMINOPHEN SCH MG: 500 TABLET ORAL at 09:08

## 2023-02-16 RX ADMIN — PANTOPRAZOLE SODIUM SCH MG: 40 TABLET, DELAYED RELEASE ORAL at 09:07

## 2023-02-16 RX ADMIN — Medication SCH MG: at 09:08

## 2023-02-16 RX ADMIN — SODIUM CHLORIDE, SODIUM LACTATE, POTASSIUM CHLORIDE, AND CALCIUM CHLORIDE PRN MLS/HR: .6; .31; .03; .02 INJECTION, SOLUTION INTRAVENOUS at 01:09

## 2023-02-16 RX ADMIN — DEXTROSE SCH MLS/HR: 50 INJECTION, SOLUTION INTRAVENOUS at 11:31

## 2023-02-16 RX ADMIN — ENOXAPARIN SODIUM SCH MG: 40 INJECTION SUBCUTANEOUS at 11:31

## 2023-02-16 RX ADMIN — Medication SCH ML: at 09:06

## 2023-02-16 RX ADMIN — OXYCODONE HYDROCHLORIDE AND ACETAMINOPHEN SCH MG: 500 TABLET ORAL at 20:48

## 2023-02-16 RX ADMIN — ANORECTAL OINTMENT PRN GM: 15.7; .44; 24; 20.6 OINTMENT TOPICAL at 09:24

## 2023-02-16 RX ADMIN — ANORECTAL OINTMENT PRN GM: 15.7; .44; 24; 20.6 OINTMENT TOPICAL at 09:16

## 2023-02-16 RX ADMIN — SODIUM CHLORIDE SCH MLS/HR: 9 INJECTION, SOLUTION INTRAVENOUS at 14:00

## 2023-02-16 RX ADMIN — ANORECTAL OINTMENT PRN GM: 15.7; .44; 24; 20.6 OINTMENT TOPICAL at 09:13

## 2023-02-16 RX ADMIN — DEXTROSE SCH MLS/HR: 50 INJECTION, SOLUTION INTRAVENOUS at 21:02

## 2023-02-16 NOTE — NUR
Slept well during the night. In no acute distress. Denies any pain or SOB. No adverse 
reaction noted from IV antibiotic. NSR on tele with HR of 79/min. Needs attended to and met. 
Safety measure maintained and call light within reached.

## 2023-02-17 VITALS — SYSTOLIC BLOOD PRESSURE: 100 MMHG | DIASTOLIC BLOOD PRESSURE: 54 MMHG

## 2023-02-17 VITALS — DIASTOLIC BLOOD PRESSURE: 48 MMHG | SYSTOLIC BLOOD PRESSURE: 93 MMHG

## 2023-02-17 VITALS — SYSTOLIC BLOOD PRESSURE: 96 MMHG | DIASTOLIC BLOOD PRESSURE: 52 MMHG

## 2023-02-17 LAB
BUN SERPL-MCNC: 11 MG/DL (ref 7–18)
CHLORIDE SERPL-SCNC: 105 MMOL/L (ref 98–107)
CO2 SERPL-SCNC: 33 MMOL/L (ref 21–32)
CREAT SERPL-MCNC: 0.5 MG/DL (ref 0.6–1.3)
GLUCOSE SERPL-MCNC: 79 MG/DL (ref 74–106)
HCT VFR BLD AUTO: 27.5 % (ref 31.2–41.9)
MAGNESIUM SERPL-MCNC: 1.8 MG/DL (ref 1.8–2.4)
MCH RBC QN AUTO: 29.9 UUG (ref 24.7–32.8)
MCV RBC AUTO: 90.9 FL (ref 75.5–95.3)
PHOSPHATE SERPL-MCNC: 2.6 MG/DL (ref 2.5–4.9)
PLATELET # BLD AUTO: 289 K/UL (ref 179–408)
POTASSIUM SERPL-SCNC: 4 MMOL/L (ref 3.5–5.1)

## 2023-02-17 RX ADMIN — ZINC SULFATE CAP 220 MG (50 MG ELEMENTAL ZN) SCH MG: 220 (50 ZN) CAP at 09:30

## 2023-02-17 RX ADMIN — PANTOPRAZOLE SODIUM SCH MG: 40 TABLET, DELAYED RELEASE ORAL at 09:30

## 2023-02-17 RX ADMIN — DEXTROSE SCH MLS/HR: 50 INJECTION, SOLUTION INTRAVENOUS at 16:00

## 2023-02-17 RX ADMIN — DEXTROSE SCH MLS/HR: 50 INJECTION, SOLUTION INTRAVENOUS at 11:00

## 2023-02-17 RX ADMIN — Medication SCH MG: at 09:30

## 2023-02-17 RX ADMIN — Medication SCH MG: at 17:00

## 2023-02-17 RX ADMIN — Medication SCH ML: at 09:47

## 2023-02-17 RX ADMIN — ENOXAPARIN SODIUM SCH MG: 40 INJECTION SUBCUTANEOUS at 09:46

## 2023-02-17 RX ADMIN — DEXTROSE SCH MLS/HR: 50 INJECTION, SOLUTION INTRAVENOUS at 00:20

## 2023-02-17 RX ADMIN — DEXTROSE SCH MLS/HR: 50 INJECTION, SOLUTION INTRAVENOUS at 09:29

## 2023-02-17 RX ADMIN — OXYCODONE HYDROCHLORIDE AND ACETAMINOPHEN SCH MG: 500 TABLET ORAL at 09:30

## 2023-02-17 NOTE — NUR
SW consult for discharge planning. Patient alert and oriented X3 and presents with euthymic 
mood and congruent affect. SW provided the patient with shoes, sweater, pants and a TAP 
card. Patient was appreciative of the resources. SW informed , Boone and charge 
nurse, Massiel.

## 2023-02-17 NOTE — NUR
pt is discharged. pt refused referrals; prefer to go back to streets. wound care supplies 
provided. Please refer to CM notes
